# Patient Record
Sex: FEMALE | Race: WHITE | Employment: FULL TIME | URBAN - METROPOLITAN AREA
[De-identification: names, ages, dates, MRNs, and addresses within clinical notes are randomized per-mention and may not be internally consistent; named-entity substitution may affect disease eponyms.]

---

## 2021-02-28 ENCOUNTER — HOSPITAL ENCOUNTER (EMERGENCY)
Age: 66
Discharge: HOME | End: 2021-02-28
Attending: EMERGENCY MEDICINE
Payer: COMMERCIAL

## 2021-02-28 ENCOUNTER — APPOINTMENT (OUTPATIENT)
Dept: GENERAL RADIOLOGY | Age: 66
End: 2021-02-28
Payer: COMMERCIAL

## 2021-02-28 VITALS
WEIGHT: 127 LBS | HEART RATE: 121 BPM | RESPIRATION RATE: 22 BRPM | DIASTOLIC BLOOD PRESSURE: 76 MMHG | OXYGEN SATURATION: 96 % | TEMPERATURE: 98.4 F | SYSTOLIC BLOOD PRESSURE: 119 MMHG

## 2021-02-28 DIAGNOSIS — J44.1 COPD EXACERBATION (HCC): Primary | ICD-10-CM

## 2021-02-28 LAB
ABSOLUTE EOS #: 1.31 K/UL (ref 0–0.44)
ABSOLUTE IMMATURE GRANULOCYTE: 0.02 K/UL (ref 0–0.3)
ABSOLUTE LYMPH #: 2.9 K/UL (ref 1.1–3.7)
ABSOLUTE MONO #: 1.04 K/UL (ref 0.1–1.2)
ALBUMIN SERPL-MCNC: 3.7 G/DL (ref 3.5–5.2)
ALBUMIN/GLOBULIN RATIO: ABNORMAL (ref 1–2.5)
ALP BLD-CCNC: 84 U/L (ref 35–104)
ALT SERPL-CCNC: 8 U/L (ref 5–33)
ANION GAP SERPL CALCULATED.3IONS-SCNC: 8 MMOL/L (ref 9–17)
AST SERPL-CCNC: 24 U/L
BASOPHILS # BLD: 1 % (ref 0–2)
BASOPHILS ABSOLUTE: 0.06 K/UL (ref 0–0.2)
BILIRUB SERPL-MCNC: 0.21 MG/DL (ref 0.3–1.2)
BUN BLDV-MCNC: 8 MG/DL (ref 8–23)
BUN/CREAT BLD: 10 (ref 9–20)
CALCIUM SERPL-MCNC: 9.3 MG/DL (ref 8.6–10.4)
CHLORIDE BLD-SCNC: 106 MMOL/L (ref 98–107)
CO2: 26 MMOL/L (ref 20–31)
CREAT SERPL-MCNC: 0.77 MG/DL (ref 0.5–0.9)
DIFFERENTIAL TYPE: ABNORMAL
EOSINOPHILS RELATIVE PERCENT: 14 % (ref 1–4)
GFR AFRICAN AMERICAN: >60 ML/MIN
GFR NON-AFRICAN AMERICAN: >60 ML/MIN
GFR SERPL CREATININE-BSD FRML MDRD: ABNORMAL ML/MIN/{1.73_M2}
GFR SERPL CREATININE-BSD FRML MDRD: ABNORMAL ML/MIN/{1.73_M2}
GLUCOSE BLD-MCNC: 98 MG/DL (ref 70–99)
HCT VFR BLD CALC: 40.5 % (ref 36.3–47.1)
HEMOGLOBIN: 13.4 G/DL (ref 11.9–15.1)
IMMATURE GRANULOCYTES: 0 %
LYMPHOCYTES # BLD: 30 % (ref 24–43)
MCH RBC QN AUTO: 35.3 PG (ref 25.2–33.5)
MCHC RBC AUTO-ENTMCNC: 33.1 G/DL (ref 28.4–34.8)
MCV RBC AUTO: 106.6 FL (ref 82.6–102.9)
MONOCYTES # BLD: 11 % (ref 3–12)
NRBC AUTOMATED: 0 PER 100 WBC
PDW BLD-RTO: 13.2 % (ref 11.8–14.4)
PLATELET # BLD: 389 K/UL (ref 138–453)
PLATELET ESTIMATE: ABNORMAL
PMV BLD AUTO: 8.9 FL (ref 8.1–13.5)
POTASSIUM SERPL-SCNC: 4.2 MMOL/L (ref 3.7–5.3)
RBC # BLD: 3.8 M/UL (ref 3.95–5.11)
RBC # BLD: ABNORMAL 10*6/UL
SARS-COV-2, RAPID: NOT DETECTED
SEG NEUTROPHILS: 44 % (ref 36–65)
SEGMENTED NEUTROPHILS ABSOLUTE COUNT: 4.27 K/UL (ref 1.5–8.1)
SODIUM BLD-SCNC: 140 MMOL/L (ref 135–144)
SPECIMEN DESCRIPTION: NORMAL
TOTAL PROTEIN: 7.1 G/DL (ref 6.4–8.3)
TROPONIN INTERP: NORMAL
TROPONIN T: NORMAL NG/ML
TROPONIN, HIGH SENSITIVITY: <6 NG/L (ref 0–14)
WBC # BLD: 9.6 K/UL (ref 3.5–11.3)
WBC # BLD: ABNORMAL 10*3/UL

## 2021-02-28 PROCEDURE — 99284 EMERGENCY DEPT VISIT MOD MDM: CPT

## 2021-02-28 PROCEDURE — 84484 ASSAY OF TROPONIN QUANT: CPT

## 2021-02-28 PROCEDURE — 80053 COMPREHEN METABOLIC PANEL: CPT

## 2021-02-28 PROCEDURE — 94761 N-INVAS EAR/PLS OXIMETRY MLT: CPT

## 2021-02-28 PROCEDURE — 71045 X-RAY EXAM CHEST 1 VIEW: CPT

## 2021-02-28 PROCEDURE — 6360000002 HC RX W HCPCS: Performed by: EMERGENCY MEDICINE

## 2021-02-28 PROCEDURE — U0002 COVID-19 LAB TEST NON-CDC: HCPCS

## 2021-02-28 PROCEDURE — 94640 AIRWAY INHALATION TREATMENT: CPT

## 2021-02-28 PROCEDURE — 85025 COMPLETE CBC W/AUTO DIFF WBC: CPT

## 2021-02-28 PROCEDURE — 2580000003 HC RX 258: Performed by: EMERGENCY MEDICINE

## 2021-02-28 PROCEDURE — 2700000000 HC OXYGEN THERAPY PER DAY

## 2021-02-28 PROCEDURE — 96374 THER/PROPH/DIAG INJ IV PUSH: CPT

## 2021-02-28 PROCEDURE — 93005 ELECTROCARDIOGRAM TRACING: CPT | Performed by: EMERGENCY MEDICINE

## 2021-02-28 RX ORDER — ALPRAZOLAM 0.25 MG/1
0.25 TABLET ORAL 2 TIMES DAILY PRN
Qty: 10 TABLET | Refills: 0 | Status: SHIPPED | OUTPATIENT
Start: 2021-02-28 | End: 2021-02-28

## 2021-02-28 RX ORDER — PREDNISONE 50 MG/1
50 TABLET ORAL DAILY
Qty: 5 TABLET | Refills: 0 | Status: SHIPPED | OUTPATIENT
Start: 2021-02-28 | End: 2021-03-05

## 2021-02-28 RX ORDER — AZITHROMYCIN 500 MG/1
500 TABLET, FILM COATED ORAL DAILY
Qty: 5 TABLET | Refills: 0 | Status: SHIPPED | OUTPATIENT
Start: 2021-02-28 | End: 2021-03-05

## 2021-02-28 RX ORDER — IPRATROPIUM BROMIDE AND ALBUTEROL SULFATE 2.5; .5 MG/3ML; MG/3ML
1 SOLUTION RESPIRATORY (INHALATION) ONCE
Status: COMPLETED | OUTPATIENT
Start: 2021-02-28 | End: 2021-02-28

## 2021-02-28 RX ORDER — 0.9 % SODIUM CHLORIDE 0.9 %
1000 INTRAVENOUS SOLUTION INTRAVENOUS ONCE
Status: COMPLETED | OUTPATIENT
Start: 2021-02-28 | End: 2021-02-28

## 2021-02-28 RX ORDER — ONDANSETRON 4 MG/1
4 TABLET, FILM COATED ORAL 3 TIMES DAILY PRN
Qty: 10 TABLET | Refills: 0 | Status: SHIPPED | OUTPATIENT
Start: 2021-02-28 | End: 2021-02-28

## 2021-02-28 RX ORDER — ALBUTEROL SULFATE 0.63 MG/3ML
1 SOLUTION RESPIRATORY (INHALATION) EVERY 6 HOURS PRN
COMMUNITY
End: 2021-03-20 | Stop reason: SDUPTHER

## 2021-02-28 RX ORDER — LEVOTHYROXINE SODIUM 0.07 MG/1
75 TABLET ORAL DAILY
COMMUNITY

## 2021-02-28 RX ORDER — DEXAMETHASONE SODIUM PHOSPHATE 4 MG/ML
4 INJECTION, SOLUTION INTRA-ARTICULAR; INTRALESIONAL; INTRAMUSCULAR; INTRAVENOUS; SOFT TISSUE ONCE
Status: COMPLETED | OUTPATIENT
Start: 2021-02-28 | End: 2021-02-28

## 2021-02-28 RX ADMIN — SODIUM CHLORIDE 1000 ML: 9 INJECTION, SOLUTION INTRAVENOUS at 04:40

## 2021-02-28 RX ADMIN — DEXAMETHASONE SODIUM PHOSPHATE 4 MG: 4 INJECTION, SOLUTION INTRAMUSCULAR; INTRAVENOUS at 04:41

## 2021-02-28 RX ADMIN — IPRATROPIUM BROMIDE AND ALBUTEROL SULFATE 1 AMPULE: 2.5; .5 SOLUTION RESPIRATORY (INHALATION) at 05:28

## 2021-02-28 ASSESSMENT — PAIN DESCRIPTION - PAIN TYPE: TYPE: ACUTE PAIN

## 2021-02-28 ASSESSMENT — PAIN DESCRIPTION - LOCATION: LOCATION: RIB CAGE

## 2021-02-28 NOTE — ED NOTES
Pt to ED c/o SOB and cough x4 days. Pt reports that she just returned from [place] and the cough began then. Pt reports that she feels soreness in her ribs from coughing and is unable to lay down. Pt has a hx of COPD.  Pt is A&Ox4, slightly labored breathing     Viet Bella, RN  02/28/21 8837

## 2021-02-28 NOTE — ED PROVIDER NOTES
EMERGENCY DEPARTMENT ENCOUNTER    Pt Name: Marisol John  MRN: [id number]  [place] 1955  Date of evaluation: 2/28/21  CHIEF COMPLAINT       Chief Complaint   Patient presents with    Shortness of Breath     x 4 days     HISTORY OF PRESENT ILLNESS   Patient is a 71-year-old female with PMH of COPD who presents to the ED for evaluation of wheezing and shortness of breath. Symptoms started 4 days ago after returning from [place]. Before arrival to the ED she tried her \"inhaler and smoked a few cigarettes\" however it did not improve her symptoms. No fevers, chest pain, abdominal pain, nausea, vomiting, changes in urine or stool. REVIEW OF SYSTEMS     Review of Systems   All other systems reviewed and are negative. PASTMEDICAL HISTORY     Past Medical History:   Diagnosis Date    COPD (chronic obstructive pulmonary disease) ([facility] [address]     Thyroid disease      SURGICAL HISTORY     No past surgical history on file. CURRENT MEDICATIONS       Previous Medications    ALBUTEROL (ACCUNEB) 0.63 MG/3ML NEBULIZER SOLUTION    Take 1 ampule by nebulization every 6 hours as needed for Wheezing    LEVOTHYROXINE (SYNTHROID) 75 MCG TABLET    Take 75 mcg by mouth Daily     ALLERGIES     has No Known Allergies. FAMILY HISTORY     has no family status information on file. SOCIAL HISTORY       Social History     Tobacco Use    Smoking status: Current Every Day Smoker    Smokeless tobacco: Never Used   Substance Use Topics    Alcohol use: Yes    Drug use: Never     PHYSICAL EXAM     INITIAL VITALS: /76   Pulse 121   Temp 98.4 °F (36.9 °C)   Resp 22   Wt 127 lb (57.6 kg)   SpO2 96%    Physical Exam  HENT:      Head: Normocephalic. Right Ear: External ear normal.      Left Ear: External ear normal.      Nose: Nose normal.   Eyes:      Conjunctiva/sclera: Conjunctivae normal.   Cardiovascular:      Rate and Rhythm: Normal rate.    Pulmonary:      Effort: Pulmonary effort is normal.      Breath sounds: Wheezing present. Abdominal:      General: Abdomen is flat. Skin:     General: Skin is dry. Neurological:      Mental Status: She is alert. Mental status is at baseline. Psychiatric:         Mood and Affect: Mood normal.         Behavior: Behavior normal.      Comments:     Limited exam secondary to Covid-19 pandemic. MEDICAL DECISION MAKING:   The patient is hemodynamically stable, afebrile, nontoxic-appearing. Physical exam remarkable for mild wheezes bilaterally. Based on history and exam likely COPD exacerbation. ED plan for rapid Covid, basic labs, chest x-ray, breathing treatment, observation. DIAGNOSTIC RESULTS   EKG:All EKG's are interpreted by the Emergency Department Physician who either signs or Co-signs this chart in the absence of a cardiologist.        RADIOLOGY:All plain film, CT, MRI, and formal ultrasound images (except ED bedside ultrasound) are read by the radiologist, see reports below, unless otherwisenoted in MDM or here. XR CHEST PORTABLE   Final Result   No acute cardiopulmonary abnormality. LABS: All lab results were reviewed by myself, and all abnormals are listed below. Labs Reviewed   CBC WITH AUTO DIFFERENTIAL - Abnormal; Notable for the following components:       Result Value    RBC 3.80 (*)     .6 (*)     MCH 35.3 (*)     Eosinophils % 14 (*)     Absolute Eos # 1.31 (*)     All other components within normal limits   COMPREHENSIVE METABOLIC PANEL W/ REFLEX TO MG FOR LOW K - Abnormal; Notable for the following components:    Anion Gap 8 (*)     Total Bilirubin 0.21 (*)     All other components within normal limits   COVID-19, RAPID   TROPONIN       EMERGENCY DEPARTMENTCOURSE:   Patient did well in the ED. Labs remarkable for:  Negative COVID-19  Potassium 4.2  Creatinine 0.77  WBC 9.6  Symptoms improved with breathing treatment. Given Decadron 4 mg IV in the ED. Given Rx for azithromycin and prednisone.   No further work-up indicated this time.      Nursing notes reviewed. At this time this is what I find, the patient appears well and does not appear sick or toxic. I gave my usual and customary discussion of the risks and benefits of discharge versus admission. I answered the patient's questions. I gave the patient strict return precautions. Patient expressed understanding of the discharge instructions. Dictated but not reviewed. Vitals:    Vitals:    02/28/21 0419 02/28/21 0444 02/28/21 0526   BP: 119/76     Pulse: 121     Resp: 28 22   Temp: 98.4 °F (36.9 °C)     SpO2: 93%  96%   Weight:  127 lb (57.6 kg)        The patient was given the following medications while in the emergency department:  Orders Placed This Encounter   Medications    ipratropium-albuterol (DUONEB) nebulizer solution 1 ampule    0.9 % sodium chloride bolus    dexamethasone (DECADRON) injection 4 mg    DISCONTD: ondansetron (ZOFRAN) 4 MG tablet     Sig: Take 1 tablet by mouth 3 times daily as needed for Nausea or Vomiting     Dispense:  10 tablet     Refill:  0    DISCONTD: ALPRAZolam (XANAX) 0.25 MG tablet     Sig: Take 1 tablet by mouth 2 times daily as needed for Anxiety for up to 30 days. Dispense:  10 tablet     Refill:  0    predniSONE (DELTASONE) 50 MG tablet     Sig: Take 1 tablet by mouth daily for 5 days     Dispense:  5 tablet     Refill:  0    azithromycin (ZITHROMAX) 500 MG tablet     Sig: Take 1 tablet by mouth daily for 5 days     Dispense:  5 tablet     Refill:  0     CONSULTS:  None    FINAL IMPRESSION      1. COPD exacerbation (HCC)          DISPOSITION/PLAN   DISPOSITION Decision To Discharge 02/28/2021 05:57:33 AM      PATIENT REFERRED TO:  No follow-up provider specified.   DISCHARGE MEDICATIONS:  New Prescriptions    AZITHROMYCIN (ZITHROMAX) 500 MG TABLET    Take 1 tablet by mouth daily for 5 days    PREDNISONE (DELTASONE) 50 MG TABLET    Take 1 tablet by mouth daily for 5 days     Chapis Godinez MD  Attending Emergency Physician                    Valentin Garsia MD  02/28/21 220 [facility] Ugo Friend MD  02/28/21 2187

## 2021-03-01 LAB
EKG ATRIAL RATE: 116 BPM
EKG P AXIS: 78 DEGREES
EKG P-R INTERVAL: 148 MS
EKG Q-T INTERVAL: 328 MS
EKG QRS DURATION: 68 MS
EKG QTC CALCULATION (BAZETT): 455 MS
EKG R AXIS: 69 DEGREES
EKG T AXIS: 74 DEGREES
EKG VENTRICULAR RATE: 116 BPM

## 2021-03-20 ENCOUNTER — APPOINTMENT (OUTPATIENT)
Dept: GENERAL RADIOLOGY | Age: 66
End: 2021-03-20
Payer: COMMERCIAL

## 2021-03-20 ENCOUNTER — HOSPITAL ENCOUNTER (EMERGENCY)
Age: 66
Discharge: HOME | End: 2021-03-20
Attending: EMERGENCY MEDICINE
Payer: COMMERCIAL

## 2021-03-20 VITALS
HEIGHT: 63 IN | HEART RATE: 89 BPM | WEIGHT: 127 LBS | SYSTOLIC BLOOD PRESSURE: 115 MMHG | TEMPERATURE: 98.5 F | DIASTOLIC BLOOD PRESSURE: 68 MMHG | OXYGEN SATURATION: 96 % | BODY MASS INDEX: 22.5 KG/M2 | RESPIRATION RATE: 15 BRPM

## 2021-03-20 DIAGNOSIS — J44.1 COPD EXACERBATION (HCC): Primary | ICD-10-CM

## 2021-03-20 LAB
ABSOLUTE EOS #: 1.46 K/UL (ref 0–0.44)
ABSOLUTE IMMATURE GRANULOCYTE: 0.02 K/UL (ref 0–0.3)
ABSOLUTE LYMPH #: 2.89 K/UL (ref 1.1–3.7)
ABSOLUTE MONO #: 0.71 K/UL (ref 0.1–1.2)
ANION GAP SERPL CALCULATED.3IONS-SCNC: 11 MMOL/L (ref 9–17)
BASOPHILS # BLD: 1 % (ref 0–2)
BASOPHILS ABSOLUTE: 0.07 K/UL (ref 0–0.2)
BNP INTERPRETATION: NORMAL
BUN BLDV-MCNC: 10 MG/DL (ref 8–23)
BUN/CREAT BLD: 12 (ref 9–20)
CALCIUM SERPL-MCNC: 9.2 MG/DL (ref 8.6–10.4)
CHLORIDE BLD-SCNC: 103 MMOL/L (ref 98–107)
CO2: 22 MMOL/L (ref 20–31)
CREAT SERPL-MCNC: 0.84 MG/DL (ref 0.5–0.9)
DIFFERENTIAL TYPE: ABNORMAL
EKG ATRIAL RATE: 107 BPM
EKG P AXIS: 77 DEGREES
EKG P-R INTERVAL: 144 MS
EKG Q-T INTERVAL: 328 MS
EKG QRS DURATION: 62 MS
EKG QTC CALCULATION (BAZETT): 437 MS
EKG R AXIS: 77 DEGREES
EKG T AXIS: 81 DEGREES
EKG VENTRICULAR RATE: 107 BPM
EOSINOPHILS RELATIVE PERCENT: 19 % (ref 1–4)
GFR AFRICAN AMERICAN: >60 ML/MIN
GFR NON-AFRICAN AMERICAN: >60 ML/MIN
GFR SERPL CREATININE-BSD FRML MDRD: NORMAL ML/MIN/{1.73_M2}
GFR SERPL CREATININE-BSD FRML MDRD: NORMAL ML/MIN/{1.73_M2}
GLUCOSE BLD-MCNC: 87 MG/DL (ref 70–99)
HCT VFR BLD CALC: 41.3 % (ref 36.3–47.1)
HEMOGLOBIN: 13.6 G/DL (ref 11.9–15.1)
IMMATURE GRANULOCYTES: 0 %
LYMPHOCYTES # BLD: 37 % (ref 24–43)
MCH RBC QN AUTO: 34.8 PG (ref 25.2–33.5)
MCHC RBC AUTO-ENTMCNC: 32.9 G/DL (ref 28.4–34.8)
MCV RBC AUTO: 105.6 FL (ref 82.6–102.9)
MONOCYTES # BLD: 9 % (ref 3–12)
MYOGLOBIN: 41 NG/ML (ref 25–58)
NRBC AUTOMATED: 0 PER 100 WBC
PDW BLD-RTO: 12.3 % (ref 11.8–14.4)
PLATELET # BLD: 391 K/UL (ref 138–453)
PLATELET ESTIMATE: ABNORMAL
PMV BLD AUTO: 9.2 FL (ref 8.1–13.5)
POTASSIUM SERPL-SCNC: 3.7 MMOL/L (ref 3.7–5.3)
PRO-BNP: 103 PG/ML
RBC # BLD: 3.91 M/UL (ref 3.95–5.11)
RBC # BLD: ABNORMAL 10*6/UL
SARS-COV-2, RAPID: NOT DETECTED
SEG NEUTROPHILS: 34 % (ref 36–65)
SEGMENTED NEUTROPHILS ABSOLUTE COUNT: 2.64 K/UL (ref 1.5–8.1)
SODIUM BLD-SCNC: 136 MMOL/L (ref 135–144)
SPECIMEN DESCRIPTION: NORMAL
TROPONIN INTERP: NORMAL
TROPONIN T: NORMAL NG/ML
TROPONIN, HIGH SENSITIVITY: 9 NG/L (ref 0–14)
WBC # BLD: 7.8 K/UL (ref 3.5–11.3)
WBC # BLD: ABNORMAL 10*3/UL

## 2021-03-20 PROCEDURE — 2700000000 HC OXYGEN THERAPY PER DAY

## 2021-03-20 PROCEDURE — 96374 THER/PROPH/DIAG INJ IV PUSH: CPT

## 2021-03-20 PROCEDURE — 83880 ASSAY OF NATRIURETIC PEPTIDE: CPT

## 2021-03-20 PROCEDURE — 80048 BASIC METABOLIC PNL TOTAL CA: CPT

## 2021-03-20 PROCEDURE — 6360000002 HC RX W HCPCS: Performed by: EMERGENCY MEDICINE

## 2021-03-20 PROCEDURE — 6370000000 HC RX 637 (ALT 250 FOR IP): Performed by: EMERGENCY MEDICINE

## 2021-03-20 PROCEDURE — 94640 AIRWAY INHALATION TREATMENT: CPT

## 2021-03-20 PROCEDURE — 83874 ASSAY OF MYOGLOBIN: CPT

## 2021-03-20 PROCEDURE — 84484 ASSAY OF TROPONIN QUANT: CPT

## 2021-03-20 PROCEDURE — 99284 EMERGENCY DEPT VISIT MOD MDM: CPT

## 2021-03-20 PROCEDURE — 85025 COMPLETE CBC W/AUTO DIFF WBC: CPT

## 2021-03-20 PROCEDURE — 94761 N-INVAS EAR/PLS OXIMETRY MLT: CPT

## 2021-03-20 PROCEDURE — U0002 COVID-19 LAB TEST NON-CDC: HCPCS

## 2021-03-20 PROCEDURE — 71045 X-RAY EXAM CHEST 1 VIEW: CPT

## 2021-03-20 PROCEDURE — 93005 ELECTROCARDIOGRAM TRACING: CPT | Performed by: EMERGENCY MEDICINE

## 2021-03-20 RX ORDER — METHYLPREDNISOLONE SODIUM SUCCINATE 125 MG/2ML
125 INJECTION, POWDER, LYOPHILIZED, FOR SOLUTION INTRAMUSCULAR; INTRAVENOUS ONCE
Status: COMPLETED | OUTPATIENT
Start: 2021-03-20 | End: 2021-03-20

## 2021-03-20 RX ORDER — IPRATROPIUM BROMIDE AND ALBUTEROL SULFATE 2.5; .5 MG/3ML; MG/3ML
1 SOLUTION RESPIRATORY (INHALATION) ONCE
Status: COMPLETED | OUTPATIENT
Start: 2021-03-20 | End: 2021-03-20

## 2021-03-20 RX ORDER — PREDNISONE 20 MG/1
40 TABLET ORAL DAILY
Qty: 16 TABLET | Refills: 0 | Status: SHIPPED | OUTPATIENT
Start: 2021-03-21 | End: 2021-03-29

## 2021-03-20 RX ORDER — AZITHROMYCIN 500 MG/1
500 TABLET, FILM COATED ORAL DAILY
Qty: 3 TABLET | Refills: 0 | Status: SHIPPED | OUTPATIENT
Start: 2021-03-20 | End: 2021-03-23

## 2021-03-20 RX ORDER — ALBUTEROL SULFATE 0.63 MG/3ML
1 SOLUTION RESPIRATORY (INHALATION) EVERY 6 HOURS PRN
Qty: 270 ML | Refills: 0 | Status: SHIPPED | OUTPATIENT
Start: 2021-03-20

## 2021-03-20 RX ADMIN — IPRATROPIUM BROMIDE AND ALBUTEROL SULFATE 1 AMPULE: .5; 3 SOLUTION RESPIRATORY (INHALATION) at 03:07

## 2021-03-20 RX ADMIN — METHYLPREDNISOLONE SODIUM SUCCINATE 125 MG: 125 INJECTION, POWDER, FOR SOLUTION INTRAMUSCULAR; INTRAVENOUS at 02:03

## 2021-03-20 ASSESSMENT — ENCOUNTER SYMPTOMS
SINUS PAIN: 0
ABDOMINAL DISTENTION: 0
VOMITING: 0
EYES NEGATIVE: 1
WHEEZING: 1
APNEA: 0
CONSTIPATION: 0
SHORTNESS OF BREATH: 1
DIARRHEA: 0
CHEST TIGHTNESS: 0
COLOR CHANGE: 0
COUGH: 1

## 2021-03-20 NOTE — ED NOTES
Bed: 20  Expected date: 3/19/21  Expected time: 2:52 PM  Means of arrival:   Comments:  Held- franklin Gunter, APRN - CNP  03/20/21 5722

## 2021-03-20 NOTE — ED NOTES
Pt is 88% on room air. Pt placed on 3L and feeling better.  Oxygen saturation now 97%     Emily Portillo RN  03/20/21 8341

## 2021-03-20 NOTE — ED PROVIDER NOTES
problem list on file for this patient. SURGICAL HISTORY     History reviewed. No pertinent surgical history. CURRENT MEDICATIONS       Discharge Medication List as of 3/20/2021  4:01 AM      CONTINUE these medications which have NOT CHANGED    Details   levothyroxine (SYNTHROID) 75 MCG tablet Take 75 mcg by mouth DailyHistorical Med           ALLERGIES     has No Known Allergies. FAMILY HISTORY     has no family status information on file. SOCIAL HISTORY       Social History     Tobacco Use    Smoking status: Current Every Day Smoker    Smokeless tobacco: Never Used   Substance Use Topics    Alcohol use: Yes    Drug use: Never     PHYSICAL EXAM     INITIAL VITALS: /68   Pulse 89   Temp 98.5 °F (36.9 °C) (Oral)   Resp 15   Ht 5' 3\" (1.6 m)   Wt 127 lb (57.6 kg)   SpO2 96%   BMI 22.50 kg/m²    Physical Exam  Constitutional:       General: She is not in acute distress. Appearance: She is well-developed. HENT:      Head: Normocephalic. Eyes:      Pupils: Pupils are equal, round, and reactive to light. Cardiovascular:      Rate and Rhythm: Regular rhythm. Tachycardia present. Heart sounds: Normal heart sounds. Pulmonary:      Effort: Pulmonary effort is normal. No respiratory distress. Breath sounds: Wheezing present. Abdominal:      General: Bowel sounds are normal.      Palpations: Abdomen is soft. Tenderness: There is no abdominal tenderness. Musculoskeletal: Normal range of motion. Skin:     General: Skin is warm and dry. Neurological:      Mental Status: She is alert and oriented to person, place, and time. MEDICAL DECISION MAKING:     Patient's clinical history and physical exam are suggestive of COPD exacerbation. Patient initially 88% on room air and was placed on 2 L nasal cannula. She does not have any significant EKG changes.   She does have wheezing throughout all lung fields on exam.  History is not suggestive for COVID-19 patient has a normal temperature and negative Covid test here in the ED. Given severity of symptoms and hypoxia on room air recommendation from my standpoint was admission. Patient states due to insurance issues and cost she is unable to stay. Patient was started on steroids and given albuterol treatment here in the ED. She is reporting symptomatic improvement with symptoms. Patient discharged per her request but instructed to return for any worsening of symptoms. CRITICAL CARE:       PROCEDURES:    Procedures    DIAGNOSTIC RESULTS   EKG:All EKG's are interpreted by the Emergency Department Physician who either signs or Co-signs this chart in the absence of a cardiologist.    EKG-poor quality interpretation adversely affected  Rate 107 sinus tachycardia  No ST or T wave changes    QTc 437  Other than elevated heart rate no significant changes    RADIOLOGY:All plain film, CT, MRI, and formal ultrasound images (except ED bedside ultrasound) are read by the radiologist, see reports below, unless otherwisenoted in MDM or here. XR CHEST PORTABLE   Final Result   Unremarkable single upright portable AP view of the chest.           LABS: All lab results were reviewed by myself, and all abnormals are listed below.   Labs Reviewed   CBC WITH AUTO DIFFERENTIAL - Abnormal; Notable for the following components:       Result Value    RBC 3.91 (*)     .6 (*)     MCH 34.8 (*)     Seg Neutrophils 34 (*)     Eosinophils % 19 (*)     Absolute Eos # 1.46 (*)     All other components within normal limits   COVID-19, RAPID   BRAIN NATRIURETIC PEPTIDE   BASIC METABOLIC PANEL W/ REFLEX TO MG FOR LOW K   TROP/MYOGLOBIN       EMERGENCY DEPARTMENTCOURSE:         Vitals:    Vitals:    03/20/21 0145 03/20/21 0237 03/20/21 0307 03/20/21 0337   BP:       Pulse: 105 88  89   Resp: 20 18 18 15   Temp:       TempSrc:       SpO2: 95% 97% 98% 96%   Weight:       Height:           The patient was given the following medications while in the emergency department:  Orders Placed This Encounter   Medications    methylPREDNISolone sodium (SOLU-MEDROL) injection 125 mg    ipratropium-albuterol (DUONEB) nebulizer solution 1 ampule    albuterol (ACCUNEB) 0.63 MG/3ML nebulizer solution     Sig: Take 3 mLs by nebulization every 6 hours as needed for Wheezing     Dispense:  270 mL     Refill:  0    azithromycin (ZITHROMAX) 500 MG tablet     Sig: Take 1 tablet by mouth daily for 3 days     Dispense:  3 tablet     Refill:  0    predniSONE (DELTASONE) 20 MG tablet     Sig: Take 2 tablets by mouth daily for 8 days Take 2 tablets daily days 1 through 5. Take 1 tablet daily days 6 through 8. Take half tablet daily days 9 through 11. Dispense:  16 tablet     Refill:  0     CONSULTS:  None    FINAL IMPRESSION      1. COPD exacerbation ([facility] [address]          DISPOSITION/PLAN   DISPOSITION Decision To Discharge 03/20/2021 03:56:49 AM      PATIENT REFERRED TO:  No follow-up provider specified. DISCHARGE MEDICATIONS:  Discharge Medication List as of 3/20/2021  4:01 AM      START taking these medications    Details   azithromycin (ZITHROMAX) 500 MG tablet Take 1 tablet by mouth daily for 3 days, Disp-3 tablet, R-0Print      predniSONE (DELTASONE) 20 MG tablet Take 2 tablets by mouth daily for 8 days Take 2 tablets daily days 1 through 5. Take 1 tablet daily days 6 through 8.   Take half tablet daily days 9 through 11., Disp-16 tablet, R-0Print           Indra Hawkins MD  Attending Emergency Physician                 Justin Sheppard MD  03/20/21 8887

## 2021-03-20 NOTE — ED NOTES
Pt taken off oxygen. Drops back down to 88%. Recommended by dr to stay in hospital pt states she needs to go home and cannot stay.       Tamara Holloway RN  03/20/21 0978

## 2025-04-30 ENCOUNTER — HOSPITAL ENCOUNTER (EMERGENCY)
Age: 70
Discharge: HOME | End: 2025-04-30
Payer: COMMERCIAL

## 2025-04-30 ENCOUNTER — APPOINTMENT (OUTPATIENT)
Dept: CT IMAGING | Age: 70
End: 2025-04-30
Payer: COMMERCIAL

## 2025-04-30 VITALS
RESPIRATION RATE: 18 BRPM | TEMPERATURE: 98.1 F | SYSTOLIC BLOOD PRESSURE: 124 MMHG | BODY MASS INDEX: 22.86 KG/M2 | HEART RATE: 82 BPM | HEIGHT: 63 IN | WEIGHT: 129 LBS | DIASTOLIC BLOOD PRESSURE: 81 MMHG | OXYGEN SATURATION: 98 %

## 2025-04-30 DIAGNOSIS — R42 DIZZINESS: Primary | ICD-10-CM

## 2025-04-30 LAB
ANION GAP SERPL CALCULATED.3IONS-SCNC: 13 MMOL/L (ref 9–16)
BASOPHILS # BLD: <0.03 K/UL (ref 0–0.2)
BASOPHILS NFR BLD: 0 % (ref 0–2)
BUN SERPL-MCNC: 14 MG/DL (ref 8–23)
CALCIUM SERPL-MCNC: 8.6 MG/DL (ref 8.8–10.2)
CHLORIDE SERPL-SCNC: 109 MMOL/L (ref 98–107)
CO2 SERPL-SCNC: 22 MMOL/L (ref 20–31)
CREAT SERPL-MCNC: 0.9 MG/DL (ref 0.5–0.9)
EOSINOPHIL # BLD: 0.14 K/UL (ref 0–0.44)
EOSINOPHILS RELATIVE PERCENT: 2 % (ref 1–4)
ERYTHROCYTE [DISTWIDTH] IN BLOOD BY AUTOMATED COUNT: 13 % (ref 11.8–14.4)
GFR, ESTIMATED: 66 ML/MIN/1.73M2
GLUCOSE SERPL-MCNC: 97 MG/DL (ref 82–115)
HCT VFR BLD AUTO: 34.7 % (ref 36.3–47.1)
HGB BLD-MCNC: 11.9 G/DL (ref 11.9–15.1)
IMM GRANULOCYTES # BLD AUTO: 0.03 K/UL (ref 0–0.3)
IMM GRANULOCYTES NFR BLD: 0 %
LYMPHOCYTES NFR BLD: 1.56 K/UL (ref 1.1–3.7)
LYMPHOCYTES RELATIVE PERCENT: 20 % (ref 24–43)
MAGNESIUM SERPL-MCNC: 2.2 MG/DL (ref 1.6–2.4)
MCH RBC QN AUTO: 36.2 PG (ref 25.2–33.5)
MCHC RBC AUTO-ENTMCNC: 34.3 G/DL (ref 28.4–34.8)
MCV RBC AUTO: 105.5 FL (ref 82.6–102.9)
MONOCYTES NFR BLD: 0.87 K/UL (ref 0.1–1.2)
MONOCYTES NFR BLD: 11 % (ref 3–12)
MYOGLOBIN SERPL-MCNC: 43 NG/ML (ref 25–58)
MYOGLOBIN SERPL-MCNC: 44 NG/ML (ref 25–58)
NEUTROPHILS NFR BLD: 67 % (ref 36–65)
NEUTS SEG NFR BLD: 5.1 K/UL (ref 1.5–8.1)
NRBC BLD-RTO: 0 PER 100 WBC
PLATELET # BLD AUTO: 278 K/UL (ref 138–453)
PMV BLD AUTO: 9.1 FL (ref 8.1–13.5)
POTASSIUM SERPL-SCNC: 4.2 MMOL/L (ref 3.7–5.3)
RBC # BLD AUTO: 3.29 M/UL (ref 3.95–5.11)
RBC # BLD: ABNORMAL 10*6/UL
SODIUM SERPL-SCNC: 145 MMOL/L (ref 136–145)
TROPONIN I SERPL HS-MCNC: <6 NG/L (ref 0–14)
TROPONIN I SERPL HS-MCNC: <6 NG/L (ref 0–14)
WBC OTHER # BLD: 7.7 K/UL (ref 3.5–11.3)

## 2025-04-30 PROCEDURE — 84484 ASSAY OF TROPONIN QUANT: CPT

## 2025-04-30 PROCEDURE — 93005 ELECTROCARDIOGRAM TRACING: CPT | Performed by: PHYSICIAN ASSISTANT

## 2025-04-30 PROCEDURE — 70450 CT HEAD/BRAIN W/O DYE: CPT

## 2025-04-30 PROCEDURE — 85025 COMPLETE CBC W/AUTO DIFF WBC: CPT

## 2025-04-30 PROCEDURE — 96374 THER/PROPH/DIAG INJ IV PUSH: CPT

## 2025-04-30 PROCEDURE — 6360000002 HC RX W HCPCS: Performed by: PHYSICIAN ASSISTANT

## 2025-04-30 PROCEDURE — 80048 BASIC METABOLIC PNL TOTAL CA: CPT

## 2025-04-30 PROCEDURE — 83735 ASSAY OF MAGNESIUM: CPT

## 2025-04-30 PROCEDURE — 2580000003 HC RX 258: Performed by: PHYSICIAN ASSISTANT

## 2025-04-30 PROCEDURE — 6370000000 HC RX 637 (ALT 250 FOR IP): Performed by: PHYSICIAN ASSISTANT

## 2025-04-30 PROCEDURE — 83874 ASSAY OF MYOGLOBIN: CPT

## 2025-04-30 PROCEDURE — 99284 EMERGENCY DEPT VISIT MOD MDM: CPT

## 2025-04-30 RX ORDER — ONDANSETRON 2 MG/ML
4 INJECTION INTRAMUSCULAR; INTRAVENOUS ONCE
Status: COMPLETED | OUTPATIENT
Start: 2025-04-30 | End: 2025-04-30

## 2025-04-30 RX ORDER — ONDANSETRON 4 MG/1
4 TABLET, ORALLY DISINTEGRATING ORAL EVERY 8 HOURS PRN
Qty: 21 TABLET | Refills: 0 | Status: SHIPPED | OUTPATIENT
Start: 2025-04-30 | End: 2025-05-07

## 2025-04-30 RX ORDER — 0.9 % SODIUM CHLORIDE 0.9 %
1000 INTRAVENOUS SOLUTION INTRAVENOUS ONCE
Status: COMPLETED | OUTPATIENT
Start: 2025-04-30 | End: 2025-04-30

## 2025-04-30 RX ORDER — MECLIZINE HCL 12.5 MG 12.5 MG/1
25 TABLET ORAL ONCE
Status: COMPLETED | OUTPATIENT
Start: 2025-04-30 | End: 2025-04-30

## 2025-04-30 RX ORDER — MECLIZINE HYDROCHLORIDE 25 MG/1
25 TABLET ORAL 3 TIMES DAILY PRN
Qty: 30 TABLET | Refills: 0 | Status: SHIPPED | OUTPATIENT
Start: 2025-04-30 | End: 2025-05-10

## 2025-04-30 RX ADMIN — MECLIZINE 25 MG: 12.5 TABLET ORAL at 09:40

## 2025-04-30 RX ADMIN — ONDANSETRON 4 MG: 2 INJECTION, SOLUTION INTRAMUSCULAR; INTRAVENOUS at 09:38

## 2025-04-30 RX ADMIN — SODIUM CHLORIDE 1000 ML: 0.9 INJECTION, SOLUTION INTRAVENOUS at 09:38

## 2025-04-30 ASSESSMENT — PAIN - FUNCTIONAL ASSESSMENT: PAIN_FUNCTIONAL_ASSESSMENT: NONE - DENIES PAIN

## 2025-04-30 NOTE — ED PROVIDER NOTES
[facility] EMERGENCY DEPARTMENT  [facility]      Pt Name: Bella Barillas  MRN: [id number]  Birthdate 1955  Date ofevaluation: 4/30/2025  Provider: Clemente Ricardo PA-C    CHIEF COMPLAINT       Chief Complaint   Patient presents with    Dizziness         HISTORY OF PRESENT ILLNESS  (Location/Symptom, Timing/Onset, Context/Setting, Quality, Duration, Modifying Factors, Severity.)   Bella Barillas is a 69 y.o. female who presents to the emergency department with dizziness.  Incident occurred roughly 2 weeks ago.  Reports sense of movement and spinning.  Have resolved.  Returned today this morning when she got out of bed.  When she moves around symptoms returned described as spinning and feeling drunk.  No chest pain shortness of breath headache chest pain shortness of breath.      Nursing Notes were reviewed.    ALLERGIES     Patient has no known allergies.    CURRENT MEDICATIONS       Previous Medications    ALBUTEROL (ACCUNEB) 0.63 MG/3ML NEBULIZER SOLUTION    Take 3 mLs by nebulization every 6 hours as needed for Wheezing    LEVOTHYROXINE (SYNTHROID) 75 MCG TABLET    Take 75 mcg by mouth Daily       PAST MEDICAL HISTORY         Diagnosis Date    COPD (chronic obstructive pulmonary disease) (HCC)     Thyroid disease        SURGICAL HISTORY     No past surgical history on file.      HISTORY     No family history on file.  No family status information on file.        SOCIAL HISTORY      reports that she has been smoking cigarettes. She has never used smokeless tobacco. She reports current alcohol use. She reports that she does not use drugs.    REVIEW OFSYSTEMS    (2-9 systems for level 4, 10 or more for level 5)   Review of Systems    Except as noted above the remainder of the review of systems was reviewed and negative.     PHYSICAL EXAM    (up to 7 for level 4, 8 or more for level 5)     ED Triage Vitals [04/30/25 0905]   BP Systolic BP Percentile Diastolic BP Percentile Temp Temp src

## 2025-04-30 NOTE — ED NOTES
Pt to the ED due to dizziness. Pt arrives via private auto to the ED with c/o dizziness that started last night. Pt states she tried to sleep it off, but was worse after she woke this morning. Pt states she feels \"foggy\" and at times the room is spinning. Pt denies any prior Hx of this occurring. Pt is Aox4, breathing equal and non-labored. Pt is independent, gait steady upon arrival to ED room 27. Pt denies any further needs at this time.

## 2025-04-30 NOTE — ED NOTES
Pt ambulated to the restroom. Pt walked an estimated 150 feet. Pt tolerated well, but did state, \"I still feel so foggy.\" Findings reported to Clemente WEBBER.

## 2025-04-30 NOTE — ED TRIAGE NOTES
Pt reports dizziness starting upon waking up, with slight palpitations. Also reports feeling unbalanced and vision issues. Reports recent intermittent episodes.

## 2025-05-01 LAB
EKG ATRIAL RATE: 74 BPM
EKG P AXIS: 63 DEGREES
EKG P-R INTERVAL: 134 MS
EKG Q-T INTERVAL: 382 MS
EKG QRS DURATION: 68 MS
EKG QTC CALCULATION (BAZETT): 424 MS
EKG R AXIS: 63 DEGREES
EKG T AXIS: 67 DEGREES
EKG VENTRICULAR RATE: 74 BPM

## 2025-07-12 ENCOUNTER — HOSPITAL ENCOUNTER (INPATIENT)
Age: 70
LOS: 4 days | Discharge: HOME | DRG: 494 | End: 2025-07-16
Attending: EMERGENCY MEDICINE | Admitting: SURGERY
Payer: COMMERCIAL

## 2025-07-12 ENCOUNTER — APPOINTMENT (OUTPATIENT)
Dept: GENERAL RADIOLOGY | Age: 70
DRG: 494 | End: 2025-07-12
Payer: COMMERCIAL

## 2025-07-12 ENCOUNTER — APPOINTMENT (OUTPATIENT)
Dept: CT IMAGING | Age: 70
DRG: 494 | End: 2025-07-12
Payer: COMMERCIAL
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
## 2025-07-12 DIAGNOSIS — S82.862A MAISONNEUVE FRACTURE OF FIBULA, LEFT, CLOSED, INITIAL ENCOUNTER: Primary | ICD-10-CM

## 2025-07-12 PROBLEM — S82.432P: Status: ACTIVE | Noted: 2025-07-12

## 2025-07-12 LAB
25(OH)D3 SERPL-MCNC: 28.9 NG/ML (ref 30–100)
ALBUMIN SERPL-MCNC: 4.1 G/DL (ref 3.5–5.2)
ALBUMIN/GLOB SERPL: 1.5 {RATIO} (ref 1–2.5)
ALP SERPL-CCNC: 82 U/L (ref 35–104)
ALT SERPL-CCNC: 10 U/L (ref 10–35)
ANION GAP SERPL CALCULATED.3IONS-SCNC: 19 MMOL/L (ref 9–16)
AST SERPL-CCNC: 28 U/L (ref 10–35)
BASOPHILS # BLD: 0.04 K/UL (ref 0–0.2)
BASOPHILS NFR BLD: 1 % (ref 0–2)
BILIRUB DIRECT SERPL-MCNC: <0.1 MG/DL (ref 0–0.2)
BILIRUB INDIRECT SERPL-MCNC: NORMAL MG/DL (ref 0–1)
BILIRUB SERPL-MCNC: 0.2 MG/DL (ref 0–1.2)
BUN SERPL-MCNC: 9 MG/DL (ref 8–23)
CALCIUM SERPL-MCNC: 8.8 MG/DL (ref 8.6–10.4)
CHLORIDE SERPL-SCNC: 99 MMOL/L (ref 98–107)
CK SERPL-CCNC: 118 U/L (ref 26–192)
CO2 SERPL-SCNC: 16 MMOL/L (ref 20–31)
CREAT SERPL-MCNC: 1 MG/DL (ref 0.6–0.9)
EOSINOPHIL # BLD: 0.18 K/UL (ref 0–0.44)
EOSINOPHILS RELATIVE PERCENT: 2 % (ref 1–4)
ERYTHROCYTE [DISTWIDTH] IN BLOOD BY AUTOMATED COUNT: 13.9 % (ref 11.8–14.4)
EST. AVERAGE GLUCOSE BLD GHB EST-MCNC: 97 MG/DL
GFR, ESTIMATED: 61 ML/MIN/1.73M2
GLOBULIN SER CALC-MCNC: 2.8 G/DL
GLUCOSE SERPL-MCNC: 85 MG/DL (ref 74–99)
HBA1C MFR BLD: 5 % (ref 4–6)
HCT VFR BLD AUTO: 38.7 % (ref 36.3–47.1)
HGB BLD-MCNC: 12.5 G/DL (ref 11.9–15.1)
IMM GRANULOCYTES # BLD AUTO: <0.03 K/UL (ref 0–0.3)
IMM GRANULOCYTES NFR BLD: 0 %
INR PPP: 0.9
LYMPHOCYTES NFR BLD: 3.73 K/UL (ref 1.1–3.7)
LYMPHOCYTES RELATIVE PERCENT: 49 % (ref 24–43)
MCH RBC QN AUTO: 34.9 PG (ref 25.2–33.5)
MCHC RBC AUTO-ENTMCNC: 32.3 G/DL (ref 28.4–34.8)
MCV RBC AUTO: 108.1 FL (ref 82.6–102.9)
MONOCYTES NFR BLD: 0.76 K/UL (ref 0.1–1.2)
MONOCYTES NFR BLD: 10 % (ref 3–12)
MYOGLOBIN SERPL-MCNC: 79 NG/ML (ref 25–58)
NEUTROPHILS NFR BLD: 38 % (ref 36–65)
NEUTS SEG NFR BLD: 2.84 K/UL (ref 1.5–8.1)
NRBC BLD-RTO: 0 PER 100 WBC
PARTIAL THROMBOPLASTIN TIME: 27.7 SEC (ref 23–36.5)
PLATELET # BLD AUTO: 341 K/UL (ref 138–453)
PMV BLD AUTO: 9.7 FL (ref 8.1–13.5)
POTASSIUM SERPL-SCNC: 4 MMOL/L (ref 3.7–5.3)
PROT SERPL-MCNC: 6.9 G/DL (ref 6.6–8.7)
PROTHROMBIN TIME: 12.6 SEC (ref 11.7–14.9)
RBC # BLD AUTO: 3.58 M/UL (ref 3.95–5.11)
RBC # BLD: ABNORMAL 10*6/UL
SODIUM SERPL-SCNC: 134 MMOL/L (ref 136–145)
T4 FREE SERPL-MCNC: 1.2 NG/DL (ref 0.9–1.7)
TSH SERPL DL<=0.05 MIU/L-ACNC: 21.8 UIU/ML (ref 0.27–4.2)
WBC OTHER # BLD: 7.6 K/UL (ref 3.5–11.3)

## 2025-07-12 PROCEDURE — 82550 ASSAY OF CK (CPK): CPT

## 2025-07-12 PROCEDURE — 70450 CT HEAD/BRAIN W/O DYE: CPT

## 2025-07-12 PROCEDURE — 85730 THROMBOPLASTIN TIME PARTIAL: CPT

## 2025-07-12 PROCEDURE — 80076 HEPATIC FUNCTION PANEL: CPT

## 2025-07-12 PROCEDURE — 29505 APPLICATION LONG LEG SPLINT: CPT

## 2025-07-12 PROCEDURE — 73610 X-RAY EXAM OF ANKLE: CPT

## 2025-07-12 PROCEDURE — 84439 ASSAY OF FREE THYROXINE: CPT

## 2025-07-12 PROCEDURE — 99222 1ST HOSP IP/OBS MODERATE 55: CPT | Performed by: SURGERY

## 2025-07-12 PROCEDURE — 84443 ASSAY THYROID STIM HORMONE: CPT

## 2025-07-12 PROCEDURE — 80048 BASIC METABOLIC PNL TOTAL CA: CPT

## 2025-07-12 PROCEDURE — 73590 X-RAY EXAM OF LOWER LEG: CPT

## 2025-07-12 PROCEDURE — 72125 CT NECK SPINE W/O DYE: CPT

## 2025-07-12 PROCEDURE — 83036 HEMOGLOBIN GLYCOSYLATED A1C: CPT

## 2025-07-12 PROCEDURE — 73562 X-RAY EXAM OF KNEE 3: CPT

## 2025-07-12 PROCEDURE — 1200000000 HC SEMI PRIVATE

## 2025-07-12 PROCEDURE — 73700 CT LOWER EXTREMITY W/O DYE: CPT

## 2025-07-12 PROCEDURE — 99285 EMERGENCY DEPT VISIT HI MDM: CPT

## 2025-07-12 PROCEDURE — 85610 PROTHROMBIN TIME: CPT

## 2025-07-12 PROCEDURE — 96376 TX/PRO/DX INJ SAME DRUG ADON: CPT

## 2025-07-12 PROCEDURE — 6360000002 HC RX W HCPCS

## 2025-07-12 PROCEDURE — 2580000003 HC RX 258

## 2025-07-12 PROCEDURE — 82306 VITAMIN D 25 HYDROXY: CPT

## 2025-07-12 PROCEDURE — 83874 ASSAY OF MYOGLOBIN: CPT

## 2025-07-12 PROCEDURE — 85025 COMPLETE CBC W/AUTO DIFF WBC: CPT

## 2025-07-12 PROCEDURE — 96374 THER/PROPH/DIAG INJ IV PUSH: CPT

## 2025-07-12 RX ORDER — SODIUM CHLORIDE 0.9 % (FLUSH) 0.9 %
5-40 SYRINGE (ML) INJECTION PRN
Status: DISCONTINUED | OUTPATIENT
Start: 2025-07-12 | End: 2025-07-16 | Stop reason: HOSPADM

## 2025-07-12 RX ORDER — ENOXAPARIN SODIUM 100 MG/ML
30 INJECTION SUBCUTANEOUS 2 TIMES DAILY
Status: DISCONTINUED | OUTPATIENT
Start: 2025-07-12 | End: 2025-07-16 | Stop reason: HOSPADM

## 2025-07-12 RX ORDER — SENNOSIDES 8.6 MG/1
1 TABLET ORAL DAILY PRN
Status: DISCONTINUED | OUTPATIENT
Start: 2025-07-12 | End: 2025-07-14

## 2025-07-12 RX ORDER — SODIUM CHLORIDE 0.9 % (FLUSH) 0.9 %
5-40 SYRINGE (ML) INJECTION EVERY 12 HOURS SCHEDULED
Status: DISCONTINUED | OUTPATIENT
Start: 2025-07-12 | End: 2025-07-15

## 2025-07-12 RX ORDER — FENTANYL CITRATE 50 UG/ML
50 INJECTION, SOLUTION INTRAMUSCULAR; INTRAVENOUS ONCE
Status: COMPLETED | OUTPATIENT
Start: 2025-07-12 | End: 2025-07-12

## 2025-07-12 RX ORDER — METHOCARBAMOL 750 MG/1
750 TABLET, FILM COATED ORAL EVERY 6 HOURS
Status: DISCONTINUED | OUTPATIENT
Start: 2025-07-12 | End: 2025-07-16 | Stop reason: HOSPADM

## 2025-07-12 RX ORDER — SODIUM CHLORIDE, SODIUM LACTATE, POTASSIUM CHLORIDE, AND CALCIUM CHLORIDE .6; .31; .03; .02 G/100ML; G/100ML; G/100ML; G/100ML
1000 INJECTION, SOLUTION INTRAVENOUS ONCE
Status: COMPLETED | OUTPATIENT
Start: 2025-07-12 | End: 2025-07-12

## 2025-07-12 RX ORDER — SODIUM CHLORIDE 9 MG/ML
INJECTION, SOLUTION INTRAVENOUS CONTINUOUS
Status: DISCONTINUED | OUTPATIENT
Start: 2025-07-12 | End: 2025-07-13

## 2025-07-12 RX ORDER — ACETAMINOPHEN 500 MG
1000 TABLET ORAL EVERY 8 HOURS SCHEDULED
Status: DISCONTINUED | OUTPATIENT
Start: 2025-07-12 | End: 2025-07-16 | Stop reason: HOSPADM

## 2025-07-12 RX ORDER — ONDANSETRON 2 MG/ML
4 INJECTION INTRAMUSCULAR; INTRAVENOUS EVERY 6 HOURS PRN
Status: DISCONTINUED | OUTPATIENT
Start: 2025-07-12 | End: 2025-07-16 | Stop reason: HOSPADM

## 2025-07-12 RX ORDER — SODIUM CHLORIDE 9 MG/ML
INJECTION, SOLUTION INTRAVENOUS PRN
Status: DISCONTINUED | OUTPATIENT
Start: 2025-07-12 | End: 2025-07-15

## 2025-07-12 RX ORDER — GABAPENTIN 300 MG/1
300 CAPSULE ORAL EVERY 8 HOURS
Status: DISCONTINUED | OUTPATIENT
Start: 2025-07-12 | End: 2025-07-16 | Stop reason: HOSPADM

## 2025-07-12 RX ORDER — POLYETHYLENE GLYCOL 3350 17 G/17G
17 POWDER, FOR SOLUTION ORAL DAILY
Status: DISCONTINUED | OUTPATIENT
Start: 2025-07-13 | End: 2025-07-16 | Stop reason: HOSPADM

## 2025-07-12 RX ORDER — ONDANSETRON 4 MG/1
4 TABLET, ORALLY DISINTEGRATING ORAL EVERY 6 HOURS PRN
Status: DISCONTINUED | OUTPATIENT
Start: 2025-07-12 | End: 2025-07-16 | Stop reason: HOSPADM

## 2025-07-12 RX ORDER — OXYCODONE HYDROCHLORIDE 5 MG/1
5 TABLET ORAL EVERY 4 HOURS PRN
Refills: 0 | Status: DISCONTINUED | OUTPATIENT
Start: 2025-07-12 | End: 2025-07-16 | Stop reason: HOSPADM

## 2025-07-12 RX ADMIN — FENTANYL CITRATE 50 MCG: 50 INJECTION, SOLUTION INTRAMUSCULAR; INTRAVENOUS at 22:03

## 2025-07-12 RX ADMIN — SODIUM CHLORIDE, SODIUM LACTATE, POTASSIUM CHLORIDE, AND CALCIUM CHLORIDE 1000 ML: .6; .31; .03; .02 INJECTION, SOLUTION INTRAVENOUS at 20:51

## 2025-07-12 RX ADMIN — FENTANYL CITRATE 50 MCG: 50 INJECTION INTRAMUSCULAR; INTRAVENOUS at 21:41

## 2025-07-12 RX ADMIN — FENTANYL CITRATE 50 MCG: 50 INJECTION INTRAMUSCULAR; INTRAVENOUS at 19:58

## 2025-07-12 ASSESSMENT — PAIN SCALES - GENERAL
PAINLEVEL_OUTOF10: 10

## 2025-07-12 ASSESSMENT — PAIN DESCRIPTION - ORIENTATION: ORIENTATION: LEFT;LOWER

## 2025-07-12 ASSESSMENT — PAIN DESCRIPTION - LOCATION: LOCATION: LEG

## 2025-07-13 PROBLEM — S82.862A MAISONNEUVE FRACTURE OF FIBULA, LEFT, CLOSED, INITIAL ENCOUNTER: Status: ACTIVE | Noted: 2025-07-13

## 2025-07-13 LAB
AMPHET UR QL SCN: NEGATIVE
BARBITURATES UR QL SCN: NEGATIVE
BENZODIAZ UR QL: NEGATIVE
CA-I BLD-SCNC: 1.07 MMOL/L (ref 1.13–1.33)
CANNABINOIDS UR QL SCN: NEGATIVE
COCAINE UR QL SCN: NEGATIVE
ETHANOL PERCENT: 0.12 %
ETHANOLAMINE SERPL-MCNC: 120 MG/DL (ref 0–0.08)
FENTANYL UR QL: POSITIVE
METHADONE UR QL: NEGATIVE
OPIATES UR QL SCN: NEGATIVE
OXYCODONE UR QL SCN: POSITIVE
PCP UR QL SCN: NEGATIVE
PHOSPHATE SERPL-MCNC: 3.2 MG/DL (ref 2.5–4.5)
TEST INFORMATION: ABNORMAL

## 2025-07-13 PROCEDURE — 82330 ASSAY OF CALCIUM: CPT

## 2025-07-13 PROCEDURE — 6360000002 HC RX W HCPCS

## 2025-07-13 PROCEDURE — 2580000003 HC RX 258

## 2025-07-13 PROCEDURE — 80307 DRUG TEST PRSMV CHEM ANLYZR: CPT

## 2025-07-13 PROCEDURE — 2500000003 HC RX 250 WO HCPCS

## 2025-07-13 PROCEDURE — 1200000000 HC SEMI PRIVATE

## 2025-07-13 PROCEDURE — 84100 ASSAY OF PHOSPHORUS: CPT

## 2025-07-13 PROCEDURE — G0480 DRUG TEST DEF 1-7 CLASSES: HCPCS

## 2025-07-13 PROCEDURE — 6370000000 HC RX 637 (ALT 250 FOR IP)

## 2025-07-13 PROCEDURE — 99231 SBSQ HOSP IP/OBS SF/LOW 25: CPT | Performed by: SURGERY

## 2025-07-13 PROCEDURE — 94761 N-INVAS EAR/PLS OXIMETRY MLT: CPT

## 2025-07-13 PROCEDURE — 2700000000 HC OXYGEN THERAPY PER DAY

## 2025-07-13 PROCEDURE — 94640 AIRWAY INHALATION TREATMENT: CPT

## 2025-07-13 RX ORDER — ERGOCALCIFEROL 1.25 MG/1
50000 CAPSULE, LIQUID FILLED ORAL WEEKLY
Qty: 12 CAPSULE | Refills: 1 | Status: SHIPPED | OUTPATIENT
Start: 2025-07-13

## 2025-07-13 RX ORDER — ESCITALOPRAM OXALATE 20 MG/1
20 TABLET ORAL DAILY
COMMUNITY

## 2025-07-13 RX ORDER — BUDESONIDE, GLYCOPYRROLATE, AND FORMOTEROL FUMARATE 160; 9; 4.8 UG/1; UG/1; UG/1
2 AEROSOL, METERED RESPIRATORY (INHALATION) 2 TIMES DAILY
COMMUNITY

## 2025-07-13 RX ORDER — PHENOBARBITAL 32.4 MG/1
32.4 TABLET ORAL 4 TIMES DAILY
Status: COMPLETED | OUTPATIENT
Start: 2025-07-13 | End: 2025-07-13

## 2025-07-13 RX ORDER — PHENOBARBITAL 32.4 MG/1
32.4 TABLET ORAL EVERY 6 HOURS PRN
Status: ACTIVE | OUTPATIENT
Start: 2025-07-13 | End: 2025-07-15

## 2025-07-13 RX ORDER — ESCITALOPRAM OXALATE 10 MG/1
20 TABLET ORAL DAILY
Status: DISCONTINUED | OUTPATIENT
Start: 2025-07-13 | End: 2025-07-16 | Stop reason: HOSPADM

## 2025-07-13 RX ORDER — PHENOBARBITAL 16.2 MG/1
16.2 TABLET ORAL 2 TIMES DAILY
Status: COMPLETED | OUTPATIENT
Start: 2025-07-15 | End: 2025-07-15

## 2025-07-13 RX ORDER — BUDESONIDE AND FORMOTEROL FUMARATE DIHYDRATE 160; 4.5 UG/1; UG/1
2 AEROSOL RESPIRATORY (INHALATION)
Status: DISCONTINUED | OUTPATIENT
Start: 2025-07-13 | End: 2025-07-16 | Stop reason: HOSPADM

## 2025-07-13 RX ORDER — PHENOBARBITAL 32.4 MG/1
32.4 TABLET ORAL 2 TIMES DAILY
Status: COMPLETED | OUTPATIENT
Start: 2025-07-14 | End: 2025-07-14

## 2025-07-13 RX ORDER — PHENOBARBITAL 16.2 MG/1
16.2 TABLET ORAL EVERY 6 HOURS PRN
Status: ACTIVE | OUTPATIENT
Start: 2025-07-15 | End: 2025-07-16

## 2025-07-13 RX ORDER — CALCIUM GLUCONATE 20 MG/ML
2000 INJECTION, SOLUTION INTRAVENOUS ONCE
Status: COMPLETED | OUTPATIENT
Start: 2025-07-13 | End: 2025-07-13

## 2025-07-13 RX ORDER — ALBUTEROL SULFATE 0.83 MG/ML
2.5 SOLUTION RESPIRATORY (INHALATION) EVERY 6 HOURS PRN
Status: DISCONTINUED | OUTPATIENT
Start: 2025-07-13 | End: 2025-07-16 | Stop reason: HOSPADM

## 2025-07-13 RX ORDER — LEVOTHYROXINE SODIUM 75 UG/1
75 TABLET ORAL DAILY
Status: DISCONTINUED | OUTPATIENT
Start: 2025-07-13 | End: 2025-07-16 | Stop reason: HOSPADM

## 2025-07-13 RX ADMIN — CALCIUM GLUCONATE 2000 MG: 20 INJECTION, SOLUTION INTRAVENOUS at 08:16

## 2025-07-13 RX ADMIN — PHENOBARBITAL 32.4 MG: 32.4 TABLET ORAL at 12:11

## 2025-07-13 RX ADMIN — SODIUM CHLORIDE: 0.9 INJECTION, SOLUTION INTRAVENOUS at 00:10

## 2025-07-13 RX ADMIN — ACETAMINOPHEN 1000 MG: 500 TABLET ORAL at 22:46

## 2025-07-13 RX ADMIN — SODIUM CHLORIDE, PRESERVATIVE FREE 10 ML: 5 INJECTION INTRAVENOUS at 19:59

## 2025-07-13 RX ADMIN — GABAPENTIN 300 MG: 300 CAPSULE ORAL at 00:05

## 2025-07-13 RX ADMIN — ESCITALOPRAM OXALATE 20 MG: 10 TABLET ORAL at 17:01

## 2025-07-13 RX ADMIN — GABAPENTIN 300 MG: 300 CAPSULE ORAL at 13:57

## 2025-07-13 RX ADMIN — PHENOBARBITAL 32.4 MG: 32.4 TABLET ORAL at 17:01

## 2025-07-13 RX ADMIN — METHOCARBAMOL 750 MG: 750 TABLET ORAL at 00:05

## 2025-07-13 RX ADMIN — ENOXAPARIN SODIUM 30 MG: 100 INJECTION SUBCUTANEOUS at 00:06

## 2025-07-13 RX ADMIN — METHOCARBAMOL 750 MG: 750 TABLET ORAL at 12:11

## 2025-07-13 RX ADMIN — OXYCODONE 5 MG: 5 TABLET ORAL at 08:07

## 2025-07-13 RX ADMIN — ACETAMINOPHEN 1000 MG: 500 TABLET ORAL at 13:57

## 2025-07-13 RX ADMIN — METHOCARBAMOL 750 MG: 750 TABLET ORAL at 06:23

## 2025-07-13 RX ADMIN — GABAPENTIN 300 MG: 300 CAPSULE ORAL at 22:46

## 2025-07-13 RX ADMIN — ACETAMINOPHEN 1000 MG: 500 TABLET ORAL at 00:05

## 2025-07-13 RX ADMIN — TIOTROPIUM BROMIDE INHALATION SPRAY 5 MCG: 3.12 SPRAY, METERED RESPIRATORY (INHALATION) at 15:38

## 2025-07-13 RX ADMIN — ENOXAPARIN SODIUM 30 MG: 100 INJECTION SUBCUTANEOUS at 19:58

## 2025-07-13 RX ADMIN — METHOCARBAMOL 750 MG: 750 TABLET ORAL at 17:01

## 2025-07-13 RX ADMIN — LEVOTHYROXINE SODIUM 75 MCG: 0.07 TABLET ORAL at 15:48

## 2025-07-13 RX ADMIN — OXYCODONE 5 MG: 5 TABLET ORAL at 15:47

## 2025-07-13 RX ADMIN — METHOCARBAMOL 750 MG: 750 TABLET ORAL at 22:46

## 2025-07-13 RX ADMIN — ACETAMINOPHEN 1000 MG: 500 TABLET ORAL at 06:23

## 2025-07-13 RX ADMIN — PHENOBARBITAL 32.4 MG: 32.4 TABLET ORAL at 19:58

## 2025-07-13 RX ADMIN — GABAPENTIN 300 MG: 300 CAPSULE ORAL at 06:23

## 2025-07-13 RX ADMIN — PHENOBARBITAL 32.4 MG: 32.4 TABLET ORAL at 10:00

## 2025-07-13 RX ADMIN — SODIUM CHLORIDE: 0.9 INJECTION, SOLUTION INTRAVENOUS at 06:30

## 2025-07-13 RX ADMIN — BUDESONIDE AND FORMOTEROL FUMARATE DIHYDRATE 2 PUFF: 160; 4.5 AEROSOL RESPIRATORY (INHALATION) at 20:01

## 2025-07-13 ASSESSMENT — PAIN DESCRIPTION - DESCRIPTORS
DESCRIPTORS: SHARP
DESCRIPTORS: ACHING
DESCRIPTORS: SHARP
DESCRIPTORS: ACHING;SHARP

## 2025-07-13 ASSESSMENT — PAIN DESCRIPTION - LOCATION
LOCATION: ANKLE
LOCATION: ANKLE
LOCATION: ANKLE;LEG
LOCATION: LEG
LOCATION: LEG

## 2025-07-13 ASSESSMENT — PAIN SCALES - GENERAL
PAINLEVEL_OUTOF10: 8

## 2025-07-13 ASSESSMENT — PAIN DESCRIPTION - ORIENTATION
ORIENTATION: LEFT
ORIENTATION: LEFT;LOWER
ORIENTATION: LEFT
ORIENTATION: LEFT;LOWER
ORIENTATION: LEFT

## 2025-07-13 ASSESSMENT — PAIN DESCRIPTION - PAIN TYPE: TYPE: ACUTE PAIN

## 2025-07-13 NOTE — ED NOTES
C-collar removed by Dr. Linares.   
Patient presents to ED via EMS on stretcher with complaints of fall.   Patient stated she was at the pool today when she tripped over a step and fell.   Patient stated she drank 6 beers today.   Patient is complaining of pain on her left lower leg.   Patient presents with C-collar in place and splint on left lower leg.   Patient expressed feelings of frustration with having the C-collar on, stated she did not hurt her neck and she does not need the C-collar.   Patient left lower leg has a visible deformity.   Patient stated no LOC.   Patient stated she does not take blood thinners.  Patient placed on continuous cardiac monitor, bp and pulse ox. IV established, blood work drawn.   Patient in NAD, A/Ox4, call light within reach, bed in lowest position.   
Pt placed on 1L NC for SPO2 91% on RA.  
A1C 5.0   eAG (mg/dL) 97 [KM]   2327 Patient admitted. [KM]      ED Course User Index  [KM] Juan Daniel MD          Skin Assessment:        Pain Score:  Pain Assessment  Pain Level: 10  Pain Location: Leg  Pain Orientation: Left, Lower      SOCIAL HISTORY       Social History     Socioeconomic History   • Marital status: Single   Tobacco Use   • Smoking status: Every Day     Types: Cigarettes   • Smokeless tobacco: Never   Substance and Sexual Activity   • Alcohol use: Yes     Comment: 2-4 times per week   • Drug use: Never     Social Drivers of Health     Financial Resource Strain: Medium Risk (4/8/2025)    Received from [facility]    Overall Financial Resource Strain (CARDIA)    • Difficulty of Paying Living Expenses: Somewhat hard   Food Insecurity: Food Insecurity Present (4/8/2025)    Received from [facility]    Hunger Vital Sign    • Within the past 12 months, you worried that your food would run out before you got the money to buy more.: Sometimes true    • Within the past 12 months, the food you bought just didn't last and you didn't have money to get more.: Sometimes true   Transportation Needs: No Transportation Needs (4/8/2025)    Received from [facility]    Transportation    • In the past 12 months, has lack of transportation kept you from medical appointments or from getting medications?: No    • In the past 12 months, has lack of transportation kept you from meetings, work, or from getting things needed for daily living?: No   Physical Activity: Insufficiently Active (4/8/2025)    Received from [facility]    Exercise Vital Sign    • Days of Exercise per Week: 3 days    • Minutes of Exercise per Session: 20 min   Stress: Stress Concern Present (4/8/2025)    Received from [facility]    [employer] [place] [employer] - Occupational Stress Questionnaire    • Feeling of Stress : Rather much   Social Connections:

## 2025-07-13 NOTE — CONSULTS
Orthopaedic Surgery Consult  (Dr. Stewart)    Time of Evaluation: 9:15 PM    CC/Reason for consult: Left severely comminuted and displaced tibial shaft fracture    HPI:      The patient is a 69 y.o. female that presents to the [facility] emergency department after sustaining a fall from what she claims to be a 4 inch step.  The patient was highly intoxicated at the time of this accident.  The patient denies hitting their head or losing consciousness in the ordeal.  The patient denies experiencing pain to any additional region of the body aside from the LLE.  Radiographs taken at the time of the patient's arrival to the emergency department did demonstrate a significantly displaced severely comminuted left distal third tibial shaft fracture.  Orthopedic surgery was consulted soon thereafter.    On examination, the patient was resting comfortably in bed visibly intoxicated.  The patient was ranging their unsupported left lower extremity without the perception of pain with an obvious deformity of the left tibia appreciable.  On questioning, the patient states that she was walking down a flight of stairs when she fell stepping off the last 4 inch step causing her left leg to bend backwards underneath her.  The patient had a complete inability to ambulate after this fall, although she did attempt to.  The patient has a past medical history consisting of hypothyroidism and COPD.  The patient has a past orthopedic surgical history consisting of a right ankle fracture that was treated conservatively via casting numerous years ago.  The patient's only daily medication is levothyroxine for her hypothyroidism.  The patient does endorse drinking alcohol at least 3 times weekly and having at least 5-6 drinks during each occasion.  These drinks consist of either beer and/or mixed drinks consisting of vodka.  The patient does smoke cigarettes for which she has a 1 pack a day smoking history.    Past Medical History:    Past

## 2025-07-13 NOTE — ED PROVIDER NOTES
[facility] EMERGENCY DEPARTMENT     Emergency Department     Faculty Attestation    I performed a history and physical examination of the patient and discussed management with the resident. I reviewed the resident’s note and agree with the documented findings and plan of care. Any areas of disagreement are noted on the chart. I was personally present for the key portions of any procedures. I have documented in the chart those procedures where I was not present during the key portions. I have reviewed the emergency nurses triage note. I agree with the chief complaint, past medical history, past surgical history, allergies, medications, social and family history as documented unless otherwise noted below. For Physician Assistant/ Nurse Practitioner cases/documentation I have personally evaluated this patient and have completed at least one if not all key elements of the E/M (history, physical exam, and MDM). Additional findings are as noted.    Note Started: 7:26 PM EDT    Patient arrived by EMS provides independent history trip and fall at home injuring her right leg.  States she was able to get back into the house did not wish to come but EMS did feel that because patient was intoxicated did not have capacity to refuse transport.  Patient denies hitting her head no anticoagulation.  Refusing to wear c-collar.  Patient is awake alert and oriented appropriate mild slurring of her words but otherwise intact.  Normal pupils.  No midline neck or back tenderness and still refusing to wear the c-collar.  EMS did splint lower extremity deformity just above the ankle significant bruising but no open injury distal pulses intact.  No other extremity injuries chest abdomen nontender will image      Critical Care     none    Mendoza Mabry MD, FACEP  Attending Emergency  Physician           Mendoza Mabry MD  07/12/25 1958    
     [facility] EMERGENCY DEPARTMENT  Emergency Department Encounter  Emergency Medicine Resident     Pt Name:Bella Barillas  MRN: [id number]  Birthdate 1955  Date of evaluation: 7/12/25  PCP:  Mendoza Lorenzo MD  Note Started: 7:51 PM EDT      CHIEF COMPLAINT       Chief Complaint   Patient presents with    Fall       HISTORY OF PRESENT ILLNESS  (Location/Symptom, Timing/Onset, Context/Setting, Quality, Duration, Modifying Factors, Severity.)      Bella Barillas is a 69 y.o. female who presents with left ankle pain and deformity after missing a step partially coming off of a porch at a friend's house.  Patient denies hitting her head or loss of consciousness.  Denies any neck pain.  Is not any blood thinners.  Only complaint is left ankle pain.  Patient was placed in a splint and transferred by EMS.  Does admit to drinking 6 beers today.  He does apparently have a history of chronic alcohol use that was told to the attending.  Denies any abdominal pain or back pain.  Patient is not taking medications on a regular basis.  Currently denies any chest pain, shortness of breath, abdominal pain, nausea or vomiting.    PAST MEDICAL / SURGICAL / SOCIAL / FAMILY HISTORY      has a past medical history of COPD (chronic obstructive pulmonary disease) (HCC) and Thyroid disease.     has no past surgical history on file.    Social History     Socioeconomic History    Marital status: Single     Spouse name: Not on file    Number of children: Not on file    Years of education: Not on file    Highest education level: Not on file   Occupational History    Not on file   Tobacco Use    Smoking status: Every Day     Types: Cigarettes    Smokeless tobacco: Never   Substance and Sexual Activity    Alcohol use: Yes     Comment: 2-4 times per week    Drug use: Never    Sexual activity: Not on file   Other Topics Concern    Not on file   Social History Narrative    Not on file     Social Drivers of Health     Financial Resource Strain: 
Abnormal; Notable for the following components:    Myoglobin 79 (*)     All other components within normal limits   TSH REFLEX TO FT4 - Abnormal; Notable for the following components:    TSH 21.80 (*)     All other components within normal limits   CK   HEMOGLOBIN A1C   HEPATIC FUNCTION PANEL   PROTIME-INR   APTT   T4, FREE   CBC WITH AUTO DIFFERENTIAL   BASIC METABOLIC PANEL   CALCIUM, IONIZED   MAGNESIUM   PHOSPHORUS   URINE DRUG SCREEN   ETHANOL       CT HEAD WO CONTRAST  Result Date: 7/12/2025  EXAMINATION: CT OF THE CERVICAL SPINE WITHOUT CONTRAST; CT OF THE HEAD WITHOUT CONTRAST 7/12/2025 10:40 pm TECHNIQUE: CT of the cervical spine was performed without the administration of intravenous contrast. Multiplanar reformatted images are provided for review. Automated exposure control, iterative reconstruction, and/or weight based adjustment of the mA/kV was utilized to reduce the radiation dose to as low as reasonably achievable.; CT of the head was performed without the administration of intravenous contrast. Automated exposure control, iterative reconstruction, and/or weight based adjustment of the mA/kV was utilized to reduce the radiation dose to as low as reasonably achievable. COMPARISON: None. HISTORY: ORDERING SYSTEM PROVIDED HISTORY: fall, intoxicated TECHNOLOGIST PROVIDED HISTORY: fall, intoxicated Decision Support Exception - unselect if not a suspected or confirmed emergency medical condition->Emergency Medical Condition (MA) Reason for Exam: fall, intoxicated CT BRAIN BRAIN/VENTRICLES: There is no acute intracranial hemorrhage, mass effect or midline shift.  No abnormal extra-axial fluid collection.  The gray-white differentiation is maintained without evidence of an acute infarct.  There is no evidence of hydrocephalus. Ventricles are within normal limits.  Artifact in the right frontal lobe laterally. ORBITS: The visualized portion of the orbits demonstrate no acute abnormality.  Lenses are

## 2025-07-13 NOTE — PROGRESS NOTES
PROGRESS NOTE          PATIENT NAME: Bella Barillas  MEDICAL RECORD NO. [id number]  DATE: 2025  PRIMARY CARE PHYSICIAN: Mendoza Lorenzo MD    HD: # 1    ASSESSMENT    Patient Active Problem List   Diagnosis    Closed disp oblique fracture of shaft of left fibula with malunion    Maisonneuve fracture of fibula, left, closed, initial encounter       MEDICAL DECISION MAKING AND PLAN    69 years old female fall from standing height sustained with left tibial and fibula fracture    Diagnosis: Left comminuted and mildly displaced fracture of tibial and fibular shaft              Plan: ortho consult, left leg in posterior long splint, pain management   -NWB  to the LLE    -Plan for OR 7/15/2025 with Dr. Shen        Chief Complaint: \"10 okay\"    SUBJECTIVE    Patient is seen and examined this morning, no acute event overnight.  Patient was started regular diet this morning as he was not able to go to surgery due to scheduling issue.  Orthopedic surgery team is planning to take patient for surgical intervention on the 7/15      OBJECTIVE  VITALS: Temp: Temp: 98.2 °F (36.8 °C)Temp  Av.3 °F (36.8 °C)  Min: 98.2 °F (36.8 °C)  Max: 98.6 °F (37 °C) BP Systolic (24hrs), Av , Min:86 , Max:118   Diastolic (24hrs), Av, Min:44, Max:76   Pulse Pulse  Av.9  Min: 59  Max: 93 Resp Resp  Av.6  Min: 13  Max: 22 Pulse ox SpO2  Av.1 %  Min: 89 %  Max: 100 %  Constitutional:       General: She is not in acute distress.     Appearance: She is normal weight. She is not ill-appearing, toxic-appearing or diaphoretic.   HENT:      Head: Normocephalic and atraumatic.      Right Ear: External ear normal.      Left Ear: External ear normal.      Nose: Nose normal. No congestion or rhinorrhea.      Mouth/Throat:      Mouth: Mucous membranes are moist.      Pharynx: Oropharynx is clear.   Eyes:      General: No scleral icterus.     Extraocular Movements: Extraocular movements intact.      Conjunctiva/sclera:

## 2025-07-13 NOTE — PLAN OF CARE
Problem: Discharge Planning  Goal: Discharge to home or other facility with appropriate resources  7/13/2025 1339 by Dorcas Elias RN  Outcome: Progressing  7/13/2025 0645 by Amanda Sood RN  Outcome: Progressing     Problem: Pain  Goal: Verbalizes/displays adequate comfort level or baseline comfort level  7/13/2025 1339 by Dorcas Elias RN  Outcome: Progressing  7/13/2025 0645 by Amanda Sood RN  Outcome: Progressing     Problem: Safety - Adult  Goal: Free from fall injury  7/13/2025 1339 by Dorcas Elias RN  Outcome: Progressing  7/13/2025 0645 by Amanda Sood RN  Outcome: Progressing     Problem: ABCDS Injury Assessment  Goal: Absence of physical injury  7/13/2025 1339 by Dorcas Elias RN  Outcome: Progressing  7/13/2025 0645 by Amanda Sood RN  Outcome: Progressing     Problem: Neurosensory - Adult  Goal: Achieves stable or improved neurological status  Outcome: Progressing     Problem: Skin/Tissue Integrity - Adult  Goal: Skin integrity remains intact  Outcome: Progressing     Problem: Musculoskeletal - Adult  Goal: Return mobility to safest level of function  Outcome: Progressing     Problem: Genitourinary - Adult  Goal: Absence of urinary retention  Outcome: Progressing

## 2025-07-13 NOTE — PLAN OF CARE
Problem: Respiratory - Adult  Goal: Achieves optimal ventilation and oxygenation  Outcome: Progressing  Flowsheets (Taken 7/13/2025 0953)  Achieves optimal ventilation and oxygenation:   Assess for changes in respiratory status   Oxygen supplementation based on oxygen saturation or arterial blood gases   Assess and instruct to report shortness of breath or any respiratory difficulty   Respiratory therapy support as indicated

## 2025-07-13 NOTE — PROGRESS NOTES
[facility] [place] [facility]  PROGRESS NOTE    Shift date: 7/13/2025  Shift day: Sunday   Shift # 2    Room # 0547/0547-01   Name: Bella Barillas                Taoist: Scientologist  Place of Mormonism: none    Referral: Routine Visit    Admit Date & Time: 7/12/2025  7:25 PM    Assessment:  Bella Barillas is a 69 y.o. female in the hospital because of Closed disp oblique fracture of shaft of left fibula with malunion. Upon entering the room writer observes pt is awake and receptive to visit.       Intervention:  Writer introduced self and title as     inquired how pt was feeling. Pt reported a lot of pain due to broken leg. Pt explained how it happened. Pt lives alone and has a cat.  inquired if she feels support from those around her. Pt said she does feel support from her son, and from two good friends who visited earlier.  inquired about liam background. Pt shared that she grew up Scientologist, though she no longer attends Buddhist. She still has liam which helps her cope during difficult times.  offered prayer w/ pt.    Outcome:  Pt expressed gratitude for visit and prayer.     Plan:  Chaplains will remain available to offer spiritual and emotional support as needed.      Electronically signed by Clemente Herring, Intern, on 7/13/2025 at 5:20 PM.  [facility]  [phone]

## 2025-07-13 NOTE — H&P
TRAUMA H&P/CONSULT    PATIENT NAME: Bella Barillas  YOB: 1955  MEDICAL RECORD NO. [id number]   DATE: 7/12/2025  PRIMARY CARE PHYSICIAN: Mendoza Lorenzo MD  PATIENT EVALUATED AT THE REQUEST OF : Raghavendra MARADIAGA   Trauma Consult-Time at bedside 0930      IMPRESSION AND PLAN:       Diagnosis: Comminuted and mildly displaced fracture of tibial and fibular shaft   Plan: ortho consult, left leg in posterior long splint, OR tmrw, npo midnight, pain management        If intracranial hemorrhage is present, is it a:  [] BIG 1  [] BIG 2  [] BIG 3  If chest wall injury: Rib score___    CONSULT SERVICES    Orthopedic Surgery      HISTORY:     Chief Complaint:  \"Fall\"    GENERAL DATA  Patient information was obtained from patient.  History/Exam limitations: intoxication.  Injury Date: 07/12/25   Approximate Injury Time: unk            SETTING OF TRAUMATIC EVENT   Location : Home    MECHANISM OF INJURY    Fall Down 4 steps      HISTORY:     Bella Barillas is a female that presented to the Emergency Department following a fall down 4 steps at home. She complains of left ankle pain. She did not hit her head or lose consciousness. She endorses alcohol ingestion, around 6 beers. She is not on blood thinners    Traumatic loss of Consciousness: No    Total Fluids Given Prior To Arrival  mL    MEDICATIONS:   []  None     []  Information not available due to exam limitations documented above    Prior to Admission medications    Medication Sig Start Date End Date Taking? Authorizing Provider   albuterol (ACCUNEB) 0.63 MG/3ML nebulizer solution Take 3 mLs by nebulization every 6 hours as needed for Wheezing 3/20/21   Goldberger, Erica B, MD   levothyroxine (SYNTHROID) 75 MCG tablet Take 75 mcg by mouth Daily    ProviderAruna MD       ALLERGIES:   []  None    []   Information not available due to exam limitations documented above     Patient has no known allergies.    PAST MEDICAL/SURGICAL HISTORY: []  None   []

## 2025-07-13 NOTE — PROGRESS NOTES
Orthopedic Progress Note    Patient:  Bella Barillas  YOB: 1955     69 y.o. female    HPI:       The patient is a 69 y.o. female that presents to the [facility] emergency department after sustaining a fall from what she claims to be a 4 inch step.  The patient was highly intoxicated at the time of this accident.  The patient denies hitting their head or losing consciousness in the ordeal.  The patient denies experiencing pain to any additional region of the body aside from the LLE.  Radiographs taken at the time of the patient's arrival to the emergency department did demonstrate a significantly displaced severely comminuted left distal third tibial shaft fracture.  Orthopedic surgery was consulted soon thereafter.     On examination, the patient was resting comfortably in bed visibly intoxicated.  The patient was ranging their unsupported left lower extremity without the perception of pain with an obvious deformity of the left tibia appreciable.  On questioning, the patient states that she was walking down a flight of stairs when she fell stepping off the last 4 inch step causing her left leg to bend backwards underneath her.  The patient had a complete inability to ambulate after this fall, although she did attempt to.  The patient has a past medical history consisting of hypothyroidism and COPD.  The patient has a past orthopedic surgical history consisting of a right ankle fracture that was treated conservatively via casting numerous years ago.  The patient's only daily medication is levothyroxine for her hypothyroidism.  The patient does endorse drinking alcohol at least 3 times weekly and having at least 5-6 drinks during each occasion.  These drinks consist of either beer and/or mixed drinks consisting of vodka.  The patient does smoke cigarettes for which she has a 1 pack a day smoking history.    Subjective:  Patient seen and examined this morning. No complaints or concerns. No

## 2025-07-14 LAB
ANION GAP SERPL CALCULATED.3IONS-SCNC: 9 MMOL/L (ref 9–16)
BASOPHILS # BLD: <0.03 K/UL (ref 0–0.2)
BASOPHILS NFR BLD: 0 % (ref 0–2)
BUN SERPL-MCNC: 11 MG/DL (ref 8–23)
CALCIUM SERPL-MCNC: 8.7 MG/DL (ref 8.6–10.4)
CHLORIDE SERPL-SCNC: 104 MMOL/L (ref 98–107)
CO2 SERPL-SCNC: 23 MMOL/L (ref 20–31)
CREAT SERPL-MCNC: 0.9 MG/DL (ref 0.6–0.9)
EOSINOPHIL # BLD: 0.14 K/UL (ref 0–0.44)
EOSINOPHILS RELATIVE PERCENT: 2 % (ref 1–4)
ERYTHROCYTE [DISTWIDTH] IN BLOOD BY AUTOMATED COUNT: 13.5 % (ref 11.8–14.4)
GFR, ESTIMATED: 70 ML/MIN/1.73M2
GLUCOSE SERPL-MCNC: 117 MG/DL (ref 74–99)
HCT VFR BLD AUTO: 30.9 % (ref 36.3–47.1)
HGB BLD-MCNC: 10.2 G/DL (ref 11.9–15.1)
IMM GRANULOCYTES # BLD AUTO: <0.03 K/UL (ref 0–0.3)
IMM GRANULOCYTES NFR BLD: 0 %
LYMPHOCYTES NFR BLD: 1.42 K/UL (ref 1.1–3.7)
LYMPHOCYTES RELATIVE PERCENT: 23 % (ref 24–43)
MAGNESIUM SERPL-MCNC: 1.9 MG/DL (ref 1.6–2.4)
MCH RBC QN AUTO: 34.8 PG (ref 25.2–33.5)
MCHC RBC AUTO-ENTMCNC: 33 G/DL (ref 28.4–34.8)
MCV RBC AUTO: 105.5 FL (ref 82.6–102.9)
MONOCYTES NFR BLD: 0.74 K/UL (ref 0.1–1.2)
MONOCYTES NFR BLD: 12 % (ref 3–12)
NEUTROPHILS NFR BLD: 63 % (ref 36–65)
NEUTS SEG NFR BLD: 3.97 K/UL (ref 1.5–8.1)
NRBC BLD-RTO: 0 PER 100 WBC
PLATELET # BLD AUTO: 266 K/UL (ref 138–453)
PMV BLD AUTO: 9.7 FL (ref 8.1–13.5)
POTASSIUM SERPL-SCNC: 4 MMOL/L (ref 3.7–5.3)
RBC # BLD AUTO: 2.93 M/UL (ref 3.95–5.11)
RBC # BLD: ABNORMAL 10*6/UL
SODIUM SERPL-SCNC: 136 MMOL/L (ref 136–145)
WBC OTHER # BLD: 6.3 K/UL (ref 3.5–11.3)

## 2025-07-14 PROCEDURE — 6370000000 HC RX 637 (ALT 250 FOR IP)

## 2025-07-14 PROCEDURE — 83735 ASSAY OF MAGNESIUM: CPT

## 2025-07-14 PROCEDURE — 6360000002 HC RX W HCPCS: Performed by: STUDENT IN AN ORGANIZED HEALTH CARE EDUCATION/TRAINING PROGRAM

## 2025-07-14 PROCEDURE — 2700000000 HC OXYGEN THERAPY PER DAY

## 2025-07-14 PROCEDURE — 6370000000 HC RX 637 (ALT 250 FOR IP): Performed by: NURSE PRACTITIONER

## 2025-07-14 PROCEDURE — 2500000003 HC RX 250 WO HCPCS

## 2025-07-14 PROCEDURE — 6360000002 HC RX W HCPCS

## 2025-07-14 PROCEDURE — 1200000000 HC SEMI PRIVATE

## 2025-07-14 PROCEDURE — 99231 SBSQ HOSP IP/OBS SF/LOW 25: CPT | Performed by: NURSE PRACTITIONER

## 2025-07-14 PROCEDURE — 94761 N-INVAS EAR/PLS OXIMETRY MLT: CPT

## 2025-07-14 PROCEDURE — 80048 BASIC METABOLIC PNL TOTAL CA: CPT

## 2025-07-14 PROCEDURE — 85025 COMPLETE CBC W/AUTO DIFF WBC: CPT

## 2025-07-14 PROCEDURE — 94640 AIRWAY INHALATION TREATMENT: CPT

## 2025-07-14 PROCEDURE — 36415 COLL VENOUS BLD VENIPUNCTURE: CPT

## 2025-07-14 RX ORDER — LANOLIN ALCOHOL/MO/W.PET/CERES
100 CREAM (GRAM) TOPICAL DAILY
Status: DISCONTINUED | OUTPATIENT
Start: 2025-07-14 | End: 2025-07-16 | Stop reason: HOSPADM

## 2025-07-14 RX ORDER — FOLIC ACID 1 MG/1
1 TABLET ORAL DAILY
Status: DISCONTINUED | OUTPATIENT
Start: 2025-07-14 | End: 2025-07-16 | Stop reason: HOSPADM

## 2025-07-14 RX ORDER — SODIUM CHLORIDE 9 MG/ML
INJECTION, SOLUTION INTRAVENOUS CONTINUOUS
Status: DISCONTINUED | OUTPATIENT
Start: 2025-07-15 | End: 2025-07-16

## 2025-07-14 RX ORDER — SENNOSIDES 8.6 MG/1
1 TABLET ORAL NIGHTLY
Status: DISCONTINUED | OUTPATIENT
Start: 2025-07-14 | End: 2025-07-16 | Stop reason: HOSPADM

## 2025-07-14 RX ADMIN — SODIUM CHLORIDE, PRESERVATIVE FREE 10 ML: 5 INJECTION INTRAVENOUS at 20:57

## 2025-07-14 RX ADMIN — TIOTROPIUM BROMIDE INHALATION SPRAY 5 MCG: 3.12 SPRAY, METERED RESPIRATORY (INHALATION) at 08:27

## 2025-07-14 RX ADMIN — ENOXAPARIN SODIUM 30 MG: 100 INJECTION SUBCUTANEOUS at 20:56

## 2025-07-14 RX ADMIN — GABAPENTIN 300 MG: 300 CAPSULE ORAL at 08:42

## 2025-07-14 RX ADMIN — ACETAMINOPHEN 1000 MG: 500 TABLET ORAL at 17:02

## 2025-07-14 RX ADMIN — HYDROMORPHONE HYDROCHLORIDE 0.5 MG: 1 INJECTION, SOLUTION INTRAMUSCULAR; INTRAVENOUS; SUBCUTANEOUS at 08:36

## 2025-07-14 RX ADMIN — LEVOTHYROXINE SODIUM 75 MCG: 0.07 TABLET ORAL at 05:13

## 2025-07-14 RX ADMIN — METHOCARBAMOL 750 MG: 750 TABLET ORAL at 17:02

## 2025-07-14 RX ADMIN — METHOCARBAMOL 750 MG: 750 TABLET ORAL at 23:56

## 2025-07-14 RX ADMIN — BUDESONIDE AND FORMOTEROL FUMARATE DIHYDRATE 2 PUFF: 160; 4.5 AEROSOL RESPIRATORY (INHALATION) at 19:54

## 2025-07-14 RX ADMIN — SODIUM CHLORIDE, PRESERVATIVE FREE 10 ML: 5 INJECTION INTRAVENOUS at 08:39

## 2025-07-14 RX ADMIN — PHENOBARBITAL 32.4 MG: 32.4 TABLET ORAL at 08:42

## 2025-07-14 RX ADMIN — ACETAMINOPHEN 1000 MG: 500 TABLET ORAL at 05:13

## 2025-07-14 RX ADMIN — ESCITALOPRAM OXALATE 20 MG: 10 TABLET ORAL at 08:45

## 2025-07-14 RX ADMIN — SENNOSIDES 8.6 MG: 8.6 TABLET, FILM COATED ORAL at 20:56

## 2025-07-14 RX ADMIN — ENOXAPARIN SODIUM 30 MG: 100 INJECTION SUBCUTANEOUS at 08:42

## 2025-07-14 RX ADMIN — OXYCODONE 5 MG: 5 TABLET ORAL at 12:01

## 2025-07-14 RX ADMIN — ACETAMINOPHEN 1000 MG: 500 TABLET ORAL at 20:56

## 2025-07-14 RX ADMIN — GABAPENTIN 300 MG: 300 CAPSULE ORAL at 17:02

## 2025-07-14 RX ADMIN — Medication 100 MG: at 17:02

## 2025-07-14 RX ADMIN — BUDESONIDE AND FORMOTEROL FUMARATE DIHYDRATE 2 PUFF: 160; 4.5 AEROSOL RESPIRATORY (INHALATION) at 08:27

## 2025-07-14 RX ADMIN — ALBUTEROL SULFATE 2.5 MG: 2.5 SOLUTION RESPIRATORY (INHALATION) at 16:42

## 2025-07-14 RX ADMIN — PHENOBARBITAL 32.4 MG: 32.4 TABLET ORAL at 20:56

## 2025-07-14 RX ADMIN — POLYETHYLENE GLYCOL 3350 17 G: 17 POWDER, FOR SOLUTION ORAL at 08:42

## 2025-07-14 RX ADMIN — OXYCODONE 5 MG: 5 TABLET ORAL at 17:08

## 2025-07-14 RX ADMIN — GABAPENTIN 300 MG: 300 CAPSULE ORAL at 23:56

## 2025-07-14 RX ADMIN — METHOCARBAMOL 750 MG: 750 TABLET ORAL at 12:01

## 2025-07-14 RX ADMIN — OXYCODONE 5 MG: 5 TABLET ORAL at 20:58

## 2025-07-14 RX ADMIN — METHOCARBAMOL 750 MG: 750 TABLET ORAL at 05:13

## 2025-07-14 RX ADMIN — FOLIC ACID 1 MG: 1 TABLET ORAL at 17:02

## 2025-07-14 ASSESSMENT — PAIN SCALES - GENERAL
PAINLEVEL_OUTOF10: 9
PAINLEVEL_OUTOF10: 6
PAINLEVEL_OUTOF10: 5
PAINLEVEL_OUTOF10: 6
PAINLEVEL_OUTOF10: 3
PAINLEVEL_OUTOF10: 7
PAINLEVEL_OUTOF10: 6
PAINLEVEL_OUTOF10: 7
PAINLEVEL_OUTOF10: 7

## 2025-07-14 ASSESSMENT — PAIN SCALES - WONG BAKER
WONGBAKER_NUMERICALRESPONSE: HURTS A LITTLE BIT
WONGBAKER_NUMERICALRESPONSE: NO HURT
WONGBAKER_NUMERICALRESPONSE: HURTS A LITTLE BIT

## 2025-07-14 ASSESSMENT — PAIN DESCRIPTION - LOCATION
LOCATION: LEG;ANKLE
LOCATION: LEG
LOCATION: ANKLE;LEG
LOCATION: LEG
LOCATION: ANKLE;LEG

## 2025-07-14 ASSESSMENT — PAIN DESCRIPTION - ORIENTATION
ORIENTATION: LEFT

## 2025-07-14 ASSESSMENT — PAIN DESCRIPTION - DESCRIPTORS
DESCRIPTORS: STABBING
DESCRIPTORS: SORE;THROBBING
DESCRIPTORS: ACHING;SORE;THROBBING
DESCRIPTORS: THROBBING;STABBING
DESCRIPTORS: ACHING;SORE;THROBBING

## 2025-07-14 NOTE — PLAN OF CARE
Problem: Discharge Planning  Goal: Discharge to home or other facility with appropriate resources  7/14/2025 0317 by Amanda Sood RN  Outcome: Progressing  7/13/2025 1339 by Dorcas Elias RN  Outcome: Progressing     Problem: Pain  Goal: Verbalizes/displays adequate comfort level or baseline comfort level  7/14/2025 0317 by Amanda Sood RN  Outcome: Progressing  7/13/2025 1339 by Dorcas Elias RN  Outcome: Progressing     Problem: Safety - Adult  Goal: Free from fall injury  7/14/2025 0317 by Amanda Sood RN  Outcome: Progressing  7/13/2025 1339 by Dorcas Elias RN  Outcome: Progressing     Problem: ABCDS Injury Assessment  Goal: Absence of physical injury  7/14/2025 0317 by Amanda Sood RN  Outcome: Progressing  7/13/2025 1339 by Dorcas Elias RN  Outcome: Progressing     Problem: Neurosensory - Adult  Goal: Achieves stable or improved neurological status  7/14/2025 0317 by Amanda Sood RN  Outcome: Progressing  7/13/2025 1339 by Dorcas Elias RN  Outcome: Progressing     Problem: Skin/Tissue Integrity - Adult  Goal: Skin integrity remains intact  7/14/2025 0317 by Amanda Sood RN  Outcome: Progressing  7/13/2025 1339 by Dorcas Elias RN  Outcome: Progressing     Problem: Musculoskeletal - Adult  Goal: Return mobility to safest level of function  7/14/2025 0317 by Amanda Sood RN  Outcome: Progressing  7/13/2025 1339 by Dorcas Elias RN  Outcome: Progressing     Problem: Genitourinary - Adult  Goal: Absence of urinary retention  7/14/2025 0317 by Amanda Sood RN  Outcome: Progressing  7/13/2025 1339 by Dorcas Elias RN  Outcome: Progressing     Problem: Respiratory - Adult  Goal: Achieves optimal ventilation and oxygenation  7/14/2025 0317 by Amanda Sood RN  Outcome: Progressing  7/13/2025 1645 by Becky Cannon RCADI  Outcome: Progressing  Flowsheets (Taken 7/13/2025 1645)  Achieves optimal ventilation and oxygenation:   Assess for changes

## 2025-07-14 NOTE — PROGRESS NOTES
Orthopedic Progress Note    Patient:  Bella Barillas  YOB: 1955     69 y.o. female    Subjective:  Patient seen and examined this morning at bedside.  Patient states she is having some pain in her left lower extremity.. No issues overnight per nursing.  Patient states some of her pain is from inability to move LLE due to long-leg splint. Denies fever, HA, N/V, or any new numbness/tingling.  Voiding appropriately.  Patient has yet to work with physical therapy.  Patient remains in long-leg splint with dressings clean dry and intact.    Vitals reviewed, afebrile with consistently soft blood pressure.    Objective:   Vitals:    07/14/25 0326   BP: (!) 89/65   Pulse: 59   Resp: 17   Temp:    SpO2: 100%     Gen: NAD, cooperative    Cardiovascular: Regular rate on the monitor  Respiratory: No acute respiratory distress, breathing comfortably    Orthopedic Exam  LLE:  Posterior long-leg splint on patient's LLE.  Patient tender to palpation about fracture site. Compartments soft and easily compressible.  Patient is able to flex and extend all toes.  Patient is sensitive to touch on all exposed digits.  Toes warm well-perfused with BCR of exposed digits.      Recent Labs     07/12/25  2125   WBC 7.6   HGB 12.5   HCT 38.7      INR 0.9   *   K 4.0   BUN 9   CREATININE 1.0*   GLUCOSE 85      Meds:   DVT ppx: Lovenox  See rec for complete list    Impression 69 y.o. female who is being seen for:    - Left distal tibial shaft fracture  - Left distal fibula fracture  - Left proximal fibula fracture     Plan  - Plan for OR 7/15/2025 with Dr. Shen   - Ancef OCTOR for 7/15  - Splint on LLE. Okay to reinforce/maintain by nursing if necessary. Please notify Ortho if saturated.  - NWB to the LLE  - Pain control and medical management per primary: Trauma surgery  - Multimodal pain control ordered   - DVT ppx: Lovenox 30 mg twice daily. At minimum recommend ASA 81mg BID for 30 days post-op on discharge

## 2025-07-14 NOTE — CARE COORDINATION
Case Management Assessment  Initial Evaluation    Date/Time of Evaluation: 7/14/2025 8:50AM  Assessment Completed by: Laverne Thibodeaux RN    If patient is discharged prior to next notation, then this note serves as note for discharge by case management.    Patient Name: Bella Barillas                   YOB: 1955  Diagnosis: Closed disp oblique fracture of shaft of left fibula with malunion [S82.432P]  Fall, initial encounter [W19.XXXA]  Maisonneuve fracture of fibula, left, closed, initial encounter [S82.862A]                   Date / Time: 7/12/2025  7:25 PM    Patient Admission Status: Inpatient   Readmission Risk (Low < 19, Mod (19-27), High > 27): Readmission Risk Score: 9.2    Current PCP: Mendoza Lorenzo MD  PCP verified by CM? (P) Yes (Mendoza Lorenzo MD)    Chart Reviewed: Yes      History Provided by: Patient  Patient Orientation: Alert and Oriented, Person, Place, Situation    Patient Cognition: Alert    Hospitalization in the last 30 days (Readmission):  No    If yes, Readmission Assessment in  Navigator will be completed.    Advance Directives:      Code Status: Full Code   Patient's Primary Decision Maker is: (P)  (states has DPOA for HC with son Yahir listed)      Discharge Planning:    Patient lives with: (P) Alone Type of Home: (P) House  Primary Care Giver: Self  Patient Support Systems include: Friends/Neighbors, Children   Current Financial resources: (P) Medicare  Current community resources:    Current services prior to admission: (P) None            Current DME:              Type of Home Care services:  (P) None    ADLS  Prior functional level: (P) Independent in ADLs/IADLs  Current functional level: (P) Assistance with the following:, Toileting, Mobility    PT AM-PAC:   /24  OT AM-PAC:   /24    Family can provide assistance at DC: (P) No  Would you like Case Management to discuss the discharge plan with any other family members/significant others, and if so, who? (P) No  Plans to

## 2025-07-14 NOTE — PROGRESS NOTES
[facility] Inpatient Progress Note  ROBERTO Harvey   7/14/2025    Bella Barillas  1955  [id number]      Time spent with Patient:  0 minutes  Time spent with Family:  0 minutes    This writer was unable to complete screen or therapy services with patient at bedside at this time due to the following:  Asleep / Would not wake    Clinician following, will assess at later point

## 2025-07-14 NOTE — CARE COORDINATION
Met with pt to complete an SBIRT.  Pt states that she fell down some stairs after having a few drinks.  Pt is alert and oriented.  She states that she drinks about 3 times a week and drinks up to 5 drinks.  She states that she doesn't use any drugs.  She denies depression symptoms.  Pt declined any resources and feels that her drinking is under control.          Alcohol Screening and Brief Intervention        No results for input(s): \"ALC\" in the last 72 hours.    Alcohol Pre-screening     (WOMEN ONLY) How many times in the past year have you had 4 or more drinks in a day?: 1 or more  TOTAL SCORE:: 13    Drug Pre-Screening        Drug Screening DAST       Mood Pre-Screening (PHQ-2)  During the past 2 weeks, have you been bothered by, feeling down, depressed or hopeless?  No        I have interviewed Bella Barillas, [id number] regarding  Her alcohol consumption/drug use and risk for excessive use. Screenings were positive.  Patient  Declined intervention at this time.   Deferred []    Completed on: 7/14/2025   ART BONNER

## 2025-07-14 NOTE — PLAN OF CARE
Problem: Discharge Planning  Goal: Discharge to home or other facility with appropriate resources  7/14/2025 1700 by Trixie Lloyd RN  Outcome: Progressing  7/14/2025 0317 by Amanda Sood RN  Outcome: Progressing     Problem: Pain  Goal: Verbalizes/displays adequate comfort level or baseline comfort level  7/14/2025 1700 by Trixie Lloyd RN  Outcome: Progressing  7/14/2025 0317 by Amanda Sood RN  Outcome: Progressing     Problem: Safety - Adult  Goal: Free from fall injury  7/14/2025 1700 by Trixie Lloyd RN  Outcome: Progressing  7/14/2025 0317 by Amanda Sood RN  Outcome: Progressing     Problem: ABCDS Injury Assessment  Goal: Absence of physical injury  7/14/2025 1700 by Trixie Lloyd RN  Outcome: Progressing  7/14/2025 0317 by Amanda Sood RN  Outcome: Progressing     Problem: Neurosensory - Adult  Goal: Achieves stable or improved neurological status  7/14/2025 1700 by Trixie Lloyd RN  Outcome: Progressing  7/14/2025 0317 by Amanda Sood RN  Outcome: Progressing     Problem: Skin/Tissue Integrity - Adult  Goal: Skin integrity remains intact  7/14/2025 1700 by Trixie Lloyd RN  Outcome: Progressing  7/14/2025 0317 by Amanda Sood RN  Outcome: Progressing     Problem: Musculoskeletal - Adult  Goal: Return mobility to safest level of function  7/14/2025 1700 by Trixie Lloyd RN  Outcome: Progressing  7/14/2025 0317 by Amanda Sood RN  Outcome: Progressing     Problem: Genitourinary - Adult  Goal: Absence of urinary retention  7/14/2025 1700 by Trixie Lloyd RN  Outcome: Progressing  7/14/2025 0317 by Amanda Sood RN  Outcome: Progressing     Problem: Respiratory - Adult  Goal: Achieves optimal ventilation and oxygenation  7/14/2025 1700 by Trixie Lloyd RN  Outcome: Progressing  7/14/2025 0317 by Amanda Sood RN  Outcome: Progressing

## 2025-07-14 NOTE — PROGRESS NOTES
Occupational Therapy    [facility]  Occupational Therapy Not Seen Note    DATE: 2025    NAME: Bella Barillas  MRN: [id number]   : 1955      Patient not seen this date for Occupational Therapy due to:    Surgery/Procedure: Plan for OR 7/15/2025 with Dr. Shen, evaluate post-op.      Electronically signed by Vianca Moss OT on 2025 at 8:27 AM

## 2025-07-14 NOTE — PROGRESS NOTES
PROGRESS NOTE          PATIENT NAME: Bella Barillas  MEDICAL RECORD NO. [id number]  DATE: 7/14/2025    HD: # 2      DIAGNOSIS AND PLAN    Fall    L tib/fib fracture   Ortho consulted   Plan for OR 7/15   NWB LLE with splint currently on    Etoh use   Monitor for withdrawal   On phenobarbital taper   Start thiamine/folic acid    Pain management   Continue tylenol, neurontin, robaxin, prn jackie   Dc dilaudid    GI   General diet   Npo at midnight for OR   Continue glycolax   Schedule senokot    DVT proph   Continue lovenox    Dispo   Pending OR 7/15      Chief Complaint: \"pain\"    SUBJECTIVE    Patient seen at bedside. Patient states she does have pain to her leg.  Is wanting to go home but knows she has surgery scheduled for tomorrow with ortho.     OBJECTIVE  VITALS:   Vitals:    07/14/25 0906   BP:    Pulse:    Resp: 18   Temp:    SpO2:      Physical Exam  Constitutional:       General: She is awake.   HENT:      Head: Normocephalic.   Cardiovascular:      Rate and Rhythm: Normal rate.      Heart sounds: Murmur heard.   Pulmonary:      Effort: Pulmonary effort is normal.   Abdominal:      Palpations: Abdomen is soft.   Musculoskeletal:      Comments: Splint intact to LLE   Skin:     General: Skin is warm.   Neurological:      Mental Status: She is alert.      GCS: GCS eye subscore is 4. GCS verbal subscore is 5. GCS motor subscore is 6.             LAB:  CBC:   Recent Labs     07/12/25 2125 07/14/25  0943   WBC 7.6 6.3   HGB 12.5 10.2*   HCT 38.7 30.9*   .1* 105.5*    266     BMP:   Recent Labs     07/12/25 2125 07/14/25  0943   * 136   K 4.0 4.0   CL 99 104   CO2 16* 23   BUN 9 11   CREATININE 1.0* 0.9   GLUCOSE 85 117*       RADIOLOGY:  No new images      Vanesa Rosenbaum, BARI - CNP  7/14/2025, 1:03 PM

## 2025-07-14 NOTE — CARE COORDINATION
Trauma Coordinator Rounding Note  Daily check in:  I met with Bella Barillas  at bedside to review their plan of care. She was in bed asleep, but wakes to verbal stimuli.  I allowed opportunity for Bella Barillas to ask questions regarding their injuries, expected length of stay and disposition plan. Pt is eager to get home.  All questions answered to verbal satisfaction.   [x]Wounds / Injuries  [x]PT/OT/speech  [x]Incentive spirometer  [x]Diet  [x]Activity  [x]Preferred pharmacy  [x] PCP Confirmed  [x] Medical Insurance Confirmed  [] Work-related Injury  [x] Pain Management Education    DC Expectation:  Patient expects to be discharged to Home  Bedside Nurse:  I attempted to speak with the patient's assigned nurse to review the plan of care for today and provide an opportunity to ask questions or relay any concerns to the Trauma service, but she was busy with another patient.     Discharge Info:  Will have follow up with Orthopedics. Appointment to be made after surgery for follow up.   :  I provided a clinical update to the unit  and confirmed the disposition plan. Current barriers to discharge include: not medically stable for discharge due to Planned Surgical procedure    Electronically signed by Radha Salcido RN on 7/14/2025 at 4:43 PM

## 2025-07-15 ENCOUNTER — ANESTHESIA (OUTPATIENT)
Dept: OPERATING ROOM | Age: 70
DRG: 494 | End: 2025-07-15
Payer: COMMERCIAL

## 2025-07-15 ENCOUNTER — APPOINTMENT (OUTPATIENT)
Dept: GENERAL RADIOLOGY | Age: 70
DRG: 494 | End: 2025-07-15
Payer: COMMERCIAL

## 2025-07-15 ENCOUNTER — ANESTHESIA EVENT (OUTPATIENT)
Dept: OPERATING ROOM | Age: 70
DRG: 494 | End: 2025-07-15
Payer: COMMERCIAL

## 2025-07-15 PROBLEM — S82.872A: Status: ACTIVE | Noted: 2025-07-15
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
## 2025-07-15 LAB
BASOPHILS # BLD: <0.03 K/UL (ref 0–0.2)
BASOPHILS NFR BLD: 0 % (ref 0–2)
EOSINOPHIL # BLD: 0.17 K/UL (ref 0–0.44)
EOSINOPHILS RELATIVE PERCENT: 3 % (ref 1–4)
ERYTHROCYTE [DISTWIDTH] IN BLOOD BY AUTOMATED COUNT: 13.5 % (ref 11.8–14.4)
GLUCOSE BLD-MCNC: 125 MG/DL (ref 65–105)
HCT VFR BLD AUTO: 27.2 % (ref 36.3–47.1)
HCT VFR BLD AUTO: 31.1 % (ref 36.3–47.1)
HGB BLD-MCNC: 10.2 G/DL (ref 11.9–15.1)
HGB BLD-MCNC: 8.9 G/DL (ref 11.9–15.1)
IMM GRANULOCYTES # BLD AUTO: <0.03 K/UL (ref 0–0.3)
IMM GRANULOCYTES NFR BLD: 0 %
LYMPHOCYTES NFR BLD: 1.87 K/UL (ref 1.1–3.7)
LYMPHOCYTES RELATIVE PERCENT: 27 % (ref 24–43)
MAGNESIUM SERPL-MCNC: 1.9 MG/DL (ref 1.6–2.4)
MCH RBC QN AUTO: 35.2 PG (ref 25.2–33.5)
MCHC RBC AUTO-ENTMCNC: 32.8 G/DL (ref 28.4–34.8)
MCV RBC AUTO: 107.2 FL (ref 82.6–102.9)
MONOCYTES NFR BLD: 0.93 K/UL (ref 0.1–1.2)
MONOCYTES NFR BLD: 14 % (ref 3–12)
NEUTROPHILS NFR BLD: 56 % (ref 36–65)
NEUTS SEG NFR BLD: 3.87 K/UL (ref 1.5–8.1)
NRBC BLD-RTO: 0 PER 100 WBC
PLATELET # BLD AUTO: 258 K/UL (ref 138–453)
PMV BLD AUTO: 9.5 FL (ref 8.1–13.5)
RBC # BLD AUTO: 2.9 M/UL (ref 3.95–5.11)
RBC # BLD: ABNORMAL 10*6/UL
WBC OTHER # BLD: 6.9 K/UL (ref 3.5–11.3)

## 2025-07-15 PROCEDURE — 85018 HEMOGLOBIN: CPT

## 2025-07-15 PROCEDURE — 2580000003 HC RX 258

## 2025-07-15 PROCEDURE — 73590 X-RAY EXAM OF LOWER LEG: CPT

## 2025-07-15 PROCEDURE — 1200000000 HC SEMI PRIVATE

## 2025-07-15 PROCEDURE — 6370000000 HC RX 637 (ALT 250 FOR IP)

## 2025-07-15 PROCEDURE — 85014 HEMATOCRIT: CPT

## 2025-07-15 PROCEDURE — 2500000003 HC RX 250 WO HCPCS

## 2025-07-15 PROCEDURE — 82947 ASSAY GLUCOSE BLOOD QUANT: CPT

## 2025-07-15 PROCEDURE — 6360000002 HC RX W HCPCS

## 2025-07-15 PROCEDURE — 73610 X-RAY EXAM OF ANKLE: CPT

## 2025-07-15 PROCEDURE — 7100000001 HC PACU RECOVERY - ADDTL 15 MIN: Performed by: STUDENT IN AN ORGANIZED HEALTH CARE EDUCATION/TRAINING PROGRAM

## 2025-07-15 PROCEDURE — 3600000004 HC SURGERY LEVEL 4 BASE: Performed by: STUDENT IN AN ORGANIZED HEALTH CARE EDUCATION/TRAINING PROGRAM

## 2025-07-15 PROCEDURE — C1769 GUIDE WIRE: HCPCS | Performed by: STUDENT IN AN ORGANIZED HEALTH CARE EDUCATION/TRAINING PROGRAM

## 2025-07-15 PROCEDURE — 2720000010 HC SURG SUPPLY STERILE: Performed by: STUDENT IN AN ORGANIZED HEALTH CARE EDUCATION/TRAINING PROGRAM

## 2025-07-15 PROCEDURE — 2500000003 HC RX 250 WO HCPCS: Performed by: STUDENT IN AN ORGANIZED HEALTH CARE EDUCATION/TRAINING PROGRAM

## 2025-07-15 PROCEDURE — 3700000001 HC ADD 15 MINUTES (ANESTHESIA): Performed by: STUDENT IN AN ORGANIZED HEALTH CARE EDUCATION/TRAINING PROGRAM

## 2025-07-15 PROCEDURE — 3600000014 HC SURGERY LEVEL 4 ADDTL 15MIN: Performed by: STUDENT IN AN ORGANIZED HEALTH CARE EDUCATION/TRAINING PROGRAM

## 2025-07-15 PROCEDURE — 0QSH04Z REPOSITION LEFT TIBIA WITH INTERNAL FIXATION DEVICE, OPEN APPROACH: ICD-10-PCS | Performed by: STUDENT IN AN ORGANIZED HEALTH CARE EDUCATION/TRAINING PROGRAM

## 2025-07-15 PROCEDURE — 0QHH06Z INSERTION OF INTRAMEDULLARY INTERNAL FIXATION DEVICE INTO LEFT TIBIA, OPEN APPROACH: ICD-10-PCS | Performed by: STUDENT IN AN ORGANIZED HEALTH CARE EDUCATION/TRAINING PROGRAM

## 2025-07-15 PROCEDURE — 94640 AIRWAY INHALATION TREATMENT: CPT

## 2025-07-15 PROCEDURE — C1713 ANCHOR/SCREW BN/BN,TIS/BN: HCPCS | Performed by: STUDENT IN AN ORGANIZED HEALTH CARE EDUCATION/TRAINING PROGRAM

## 2025-07-15 PROCEDURE — 0SSG04Z REPOSITION LEFT ANKLE JOINT WITH INTERNAL FIXATION DEVICE, OPEN APPROACH: ICD-10-PCS | Performed by: STUDENT IN AN ORGANIZED HEALTH CARE EDUCATION/TRAINING PROGRAM

## 2025-07-15 PROCEDURE — 85025 COMPLETE CBC W/AUTO DIFF WBC: CPT

## 2025-07-15 PROCEDURE — 2709999900 HC NON-CHARGEABLE SUPPLY: Performed by: STUDENT IN AN ORGANIZED HEALTH CARE EDUCATION/TRAINING PROGRAM

## 2025-07-15 PROCEDURE — 2700000000 HC OXYGEN THERAPY PER DAY

## 2025-07-15 PROCEDURE — 6360000002 HC RX W HCPCS: Performed by: ANESTHESIOLOGY

## 2025-07-15 PROCEDURE — 83735 ASSAY OF MAGNESIUM: CPT

## 2025-07-15 PROCEDURE — 36415 COLL VENOUS BLD VENIPUNCTURE: CPT

## 2025-07-15 PROCEDURE — 94761 N-INVAS EAR/PLS OXIMETRY MLT: CPT

## 2025-07-15 PROCEDURE — 6370000000 HC RX 637 (ALT 250 FOR IP): Performed by: NURSE PRACTITIONER

## 2025-07-15 PROCEDURE — 6360000002 HC RX W HCPCS: Performed by: NURSE ANESTHETIST, CERTIFIED REGISTERED

## 2025-07-15 PROCEDURE — 3700000000 HC ANESTHESIA ATTENDED CARE: Performed by: STUDENT IN AN ORGANIZED HEALTH CARE EDUCATION/TRAINING PROGRAM

## 2025-07-15 PROCEDURE — 7100000000 HC PACU RECOVERY - FIRST 15 MIN: Performed by: STUDENT IN AN ORGANIZED HEALTH CARE EDUCATION/TRAINING PROGRAM

## 2025-07-15 DEVICE — SCREW BONE L20MM DIA3MM ANK S STL CANC NONLOCKING LO PROF: Type: IMPLANTABLE DEVICE | Site: FIBULA | Status: FUNCTIONAL

## 2025-07-15 DEVICE — ADVANCED LOCKING SCREW: Type: IMPLANTABLE DEVICE | Site: TIBIA | Status: FUNCTIONAL

## 2025-07-15 DEVICE — SCREW BNE L16MM DIA3MM CANC ANK S STL NONLOCKING LO PROF: Type: IMPLANTABLE DEVICE | Site: FIBULA | Status: FUNCTIONAL

## 2025-07-15 DEVICE — TIBIAL NAIL
Type: IMPLANTABLE DEVICE | Site: TIBIA | Status: FUNCTIONAL
Brand: T2 ALPHA

## 2025-07-15 DEVICE — IMPLANTABLE DEVICE: Type: IMPLANTABLE DEVICE | Site: FIBULA | Status: FUNCTIONAL

## 2025-07-15 DEVICE — SCREW BNE L34MM DIA3.5MM MINI FT ANK TI SELF DRL ST CANN: Type: IMPLANTABLE DEVICE | Site: FIBULA | Status: FUNCTIONAL

## 2025-07-15 DEVICE — LOCKING SCREW
Type: IMPLANTABLE DEVICE | Site: FIBULA | Status: FUNCTIONAL
Brand: T2 ALPHA

## 2025-07-15 DEVICE — 3.0X130MM FIBULA NAIL, LEFT
Type: IMPLANTABLE DEVICE | Site: FIBULA | Status: FUNCTIONAL
Brand: ARTHREX®

## 2025-07-15 RX ORDER — FENTANYL CITRATE 50 UG/ML
INJECTION, SOLUTION INTRAMUSCULAR; INTRAVENOUS
Status: DISCONTINUED | OUTPATIENT
Start: 2025-07-15 | End: 2025-07-15 | Stop reason: SDUPTHER

## 2025-07-15 RX ORDER — ROCURONIUM BROMIDE 10 MG/ML
INJECTION, SOLUTION INTRAVENOUS
Status: DISCONTINUED | OUTPATIENT
Start: 2025-07-15 | End: 2025-07-15 | Stop reason: SDUPTHER

## 2025-07-15 RX ORDER — SODIUM CHLORIDE 0.9 % (FLUSH) 0.9 %
5-40 SYRINGE (ML) INJECTION EVERY 12 HOURS SCHEDULED
Status: DISCONTINUED | OUTPATIENT
Start: 2025-07-15 | End: 2025-07-15 | Stop reason: HOSPADM

## 2025-07-15 RX ORDER — FENTANYL CITRATE 50 UG/ML
25 INJECTION, SOLUTION INTRAMUSCULAR; INTRAVENOUS EVERY 5 MIN PRN
Status: DISCONTINUED | OUTPATIENT
Start: 2025-07-15 | End: 2025-07-15 | Stop reason: HOSPADM

## 2025-07-15 RX ORDER — DEXAMETHASONE SODIUM PHOSPHATE 4 MG/ML
4 INJECTION, SOLUTION INTRA-ARTICULAR; INTRALESIONAL; INTRAMUSCULAR; INTRAVENOUS; SOFT TISSUE ONCE
Status: COMPLETED | OUTPATIENT
Start: 2025-07-15 | End: 2025-07-15

## 2025-07-15 RX ORDER — FENTANYL CITRATE 50 UG/ML
100 INJECTION, SOLUTION INTRAMUSCULAR; INTRAVENOUS ONCE
Status: DISCONTINUED | OUTPATIENT
Start: 2025-07-15 | End: 2025-07-15 | Stop reason: HOSPADM

## 2025-07-15 RX ORDER — MAGNESIUM HYDROXIDE 1200 MG/15ML
LIQUID ORAL CONTINUOUS PRN
Status: COMPLETED | OUTPATIENT
Start: 2025-07-15 | End: 2025-07-15

## 2025-07-15 RX ORDER — PROPOFOL 10 MG/ML
INJECTION, EMULSION INTRAVENOUS
Status: DISCONTINUED | OUTPATIENT
Start: 2025-07-15 | End: 2025-07-15 | Stop reason: SDUPTHER

## 2025-07-15 RX ORDER — ALBUTEROL SULFATE 90 UG/1
INHALANT RESPIRATORY (INHALATION)
Status: DISCONTINUED | OUTPATIENT
Start: 2025-07-15 | End: 2025-07-15 | Stop reason: SDUPTHER

## 2025-07-15 RX ORDER — ONDANSETRON 2 MG/ML
INJECTION INTRAMUSCULAR; INTRAVENOUS
Status: DISCONTINUED | OUTPATIENT
Start: 2025-07-15 | End: 2025-07-15 | Stop reason: SDUPTHER

## 2025-07-15 RX ORDER — SODIUM CHLORIDE, SODIUM LACTATE, POTASSIUM CHLORIDE, CALCIUM CHLORIDE 600; 310; 30; 20 MG/100ML; MG/100ML; MG/100ML; MG/100ML
INJECTION, SOLUTION INTRAVENOUS
Status: DISCONTINUED | OUTPATIENT
Start: 2025-07-15 | End: 2025-07-15 | Stop reason: SDUPTHER

## 2025-07-15 RX ORDER — SODIUM CHLORIDE 9 MG/ML
INJECTION, SOLUTION INTRAVENOUS PRN
Status: DISCONTINUED | OUTPATIENT
Start: 2025-07-15 | End: 2025-07-15 | Stop reason: HOSPADM

## 2025-07-15 RX ORDER — PHENYLEPHRINE HCL IN 0.9% NACL 1 MG/10 ML
SYRINGE (ML) INTRAVENOUS
Status: DISCONTINUED | OUTPATIENT
Start: 2025-07-15 | End: 2025-07-15 | Stop reason: SDUPTHER

## 2025-07-15 RX ORDER — KETOROLAC TROMETHAMINE 30 MG/ML
INJECTION, SOLUTION INTRAMUSCULAR; INTRAVENOUS
Status: DISCONTINUED | OUTPATIENT
Start: 2025-07-15 | End: 2025-07-15 | Stop reason: SDUPTHER

## 2025-07-15 RX ORDER — MIDAZOLAM HYDROCHLORIDE 2 MG/2ML
2 INJECTION, SOLUTION INTRAMUSCULAR; INTRAVENOUS ONCE
Status: COMPLETED | OUTPATIENT
Start: 2025-07-15 | End: 2025-07-15

## 2025-07-15 RX ORDER — DIPHENHYDRAMINE HYDROCHLORIDE 50 MG/ML
12.5 INJECTION, SOLUTION INTRAMUSCULAR; INTRAVENOUS
Status: DISCONTINUED | OUTPATIENT
Start: 2025-07-15 | End: 2025-07-15 | Stop reason: HOSPADM

## 2025-07-15 RX ORDER — SODIUM CHLORIDE 0.9 % (FLUSH) 0.9 %
5-40 SYRINGE (ML) INJECTION PRN
Status: DISCONTINUED | OUTPATIENT
Start: 2025-07-15 | End: 2025-07-15 | Stop reason: HOSPADM

## 2025-07-15 RX ORDER — ONDANSETRON 2 MG/ML
4 INJECTION INTRAMUSCULAR; INTRAVENOUS
Status: DISCONTINUED | OUTPATIENT
Start: 2025-07-15 | End: 2025-07-15 | Stop reason: HOSPADM

## 2025-07-15 RX ORDER — DEXAMETHASONE SODIUM PHOSPHATE 10 MG/ML
INJECTION, SOLUTION INTRA-ARTICULAR; INTRALESIONAL; INTRAMUSCULAR; INTRAVENOUS; SOFT TISSUE
Status: DISCONTINUED | OUTPATIENT
Start: 2025-07-15 | End: 2025-07-15 | Stop reason: SDUPTHER

## 2025-07-15 RX ORDER — GLYCOPYRROLATE 1 MG/5 ML
SYRINGE (ML) INTRAVENOUS
Status: DISCONTINUED | OUTPATIENT
Start: 2025-07-15 | End: 2025-07-15 | Stop reason: SDUPTHER

## 2025-07-15 RX ADMIN — ENOXAPARIN SODIUM 30 MG: 100 INJECTION SUBCUTANEOUS at 21:00

## 2025-07-15 RX ADMIN — GABAPENTIN 300 MG: 300 CAPSULE ORAL at 16:18

## 2025-07-15 RX ADMIN — PHENOBARBITAL 16.2 MG: 16.2 TABLET ORAL at 09:58

## 2025-07-15 RX ADMIN — Medication 100 MCG: at 11:55

## 2025-07-15 RX ADMIN — DEXAMETHASONE SODIUM PHOSPHATE 4 MG: 4 INJECTION INTRA-ARTICULAR; INTRALESIONAL; INTRAMUSCULAR; INTRAVENOUS; SOFT TISSUE at 10:49

## 2025-07-15 RX ADMIN — OXYCODONE 5 MG: 5 TABLET ORAL at 04:46

## 2025-07-15 RX ADMIN — KETOROLAC TROMETHAMINE 15 MG: 30 INJECTION, SOLUTION INTRAMUSCULAR at 13:07

## 2025-07-15 RX ADMIN — PROPOFOL 30 MG: 10 INJECTION, EMULSION INTRAVENOUS at 13:33

## 2025-07-15 RX ADMIN — METHOCARBAMOL 750 MG: 750 TABLET ORAL at 06:05

## 2025-07-15 RX ADMIN — OXYCODONE 5 MG: 5 TABLET ORAL at 15:55

## 2025-07-15 RX ADMIN — SENNOSIDES 8.6 MG: 8.6 TABLET, FILM COATED ORAL at 21:00

## 2025-07-15 RX ADMIN — ONDANSETRON 4 MG: 2 INJECTION, SOLUTION INTRAMUSCULAR; INTRAVENOUS at 13:07

## 2025-07-15 RX ADMIN — FENTANYL CITRATE 50 MCG: 50 INJECTION, SOLUTION INTRAMUSCULAR; INTRAVENOUS at 11:38

## 2025-07-15 RX ADMIN — BUDESONIDE AND FORMOTEROL FUMARATE DIHYDRATE 2 PUFF: 160; 4.5 AEROSOL RESPIRATORY (INHALATION) at 08:59

## 2025-07-15 RX ADMIN — FENTANYL CITRATE 50 MCG: 50 INJECTION, SOLUTION INTRAMUSCULAR; INTRAVENOUS at 11:52

## 2025-07-15 RX ADMIN — ALBUTEROL SULFATE 8 PUFF: 90 AEROSOL, METERED RESPIRATORY (INHALATION) at 13:24

## 2025-07-15 RX ADMIN — DEXAMETHASONE SODIUM PHOSPHATE 10 MG: 10 INJECTION INTRAMUSCULAR; INTRAVENOUS at 11:48

## 2025-07-15 RX ADMIN — LEVOTHYROXINE SODIUM 75 MCG: 0.07 TABLET ORAL at 06:06

## 2025-07-15 RX ADMIN — FOLIC ACID 1 MG: 1 TABLET ORAL at 09:58

## 2025-07-15 RX ADMIN — Medication 200 MCG: at 13:06

## 2025-07-15 RX ADMIN — Medication 2000 MG: at 21:01

## 2025-07-15 RX ADMIN — Medication 0.4 MG: at 12:13

## 2025-07-15 RX ADMIN — Medication 100 MCG: at 12:00

## 2025-07-15 RX ADMIN — FENTANYL CITRATE 25 MCG: 50 INJECTION, SOLUTION INTRAMUSCULAR; INTRAVENOUS at 12:54

## 2025-07-15 RX ADMIN — ESCITALOPRAM OXALATE 20 MG: 10 TABLET ORAL at 09:59

## 2025-07-15 RX ADMIN — PROPOFOL 130 MG: 10 INJECTION, EMULSION INTRAVENOUS at 11:38

## 2025-07-15 RX ADMIN — PANTOPRAZOLE SODIUM 40 MG: 40 INJECTION, POWDER, FOR SOLUTION INTRAVENOUS at 21:00

## 2025-07-15 RX ADMIN — OXYCODONE 5 MG: 5 TABLET ORAL at 21:00

## 2025-07-15 RX ADMIN — ACETAMINOPHEN 1000 MG: 500 TABLET ORAL at 06:05

## 2025-07-15 RX ADMIN — PROPOFOL 40 MG: 10 INJECTION, EMULSION INTRAVENOUS at 13:17

## 2025-07-15 RX ADMIN — ACETAMINOPHEN 1000 MG: 500 TABLET ORAL at 15:52

## 2025-07-15 RX ADMIN — SODIUM CHLORIDE, POTASSIUM CHLORIDE, SODIUM LACTATE AND CALCIUM CHLORIDE: 600; 310; 30; 20 INJECTION, SOLUTION INTRAVENOUS at 10:30

## 2025-07-15 RX ADMIN — FENTANYL CITRATE 25 MCG: 50 INJECTION INTRAMUSCULAR; INTRAVENOUS at 14:11

## 2025-07-15 RX ADMIN — SODIUM CHLORIDE: 0.9 INJECTION, SOLUTION INTRAVENOUS at 00:02

## 2025-07-15 RX ADMIN — PHENOBARBITAL 16.2 MG: 16.2 TABLET ORAL at 21:00

## 2025-07-15 RX ADMIN — BUDESONIDE AND FORMOTEROL FUMARATE DIHYDRATE 2 PUFF: 160; 4.5 AEROSOL RESPIRATORY (INHALATION) at 21:29

## 2025-07-15 RX ADMIN — Medication 100 MCG: at 12:10

## 2025-07-15 RX ADMIN — METHOCARBAMOL 750 MG: 750 TABLET ORAL at 15:52

## 2025-07-15 RX ADMIN — MIDAZOLAM HYDROCHLORIDE 2 MG: 1 INJECTION, SOLUTION INTRAMUSCULAR; INTRAVENOUS at 10:49

## 2025-07-15 RX ADMIN — Medication 100 MG: at 09:58

## 2025-07-15 RX ADMIN — GABAPENTIN 300 MG: 300 CAPSULE ORAL at 06:04

## 2025-07-15 RX ADMIN — Medication 2000 MG: at 11:47

## 2025-07-15 RX ADMIN — TIOTROPIUM BROMIDE INHALATION SPRAY 5 MCG: 3.12 SPRAY, METERED RESPIRATORY (INHALATION) at 08:59

## 2025-07-15 RX ADMIN — ACETAMINOPHEN 1000 MG: 500 TABLET ORAL at 21:00

## 2025-07-15 RX ADMIN — PROPOFOL 30 MG: 10 INJECTION, EMULSION INTRAVENOUS at 13:30

## 2025-07-15 RX ADMIN — SUGAMMADEX 130 MG: 100 INJECTION, SOLUTION INTRAVENOUS at 13:10

## 2025-07-15 RX ADMIN — Medication 100 MCG: at 13:27

## 2025-07-15 RX ADMIN — FENTANYL CITRATE 25 MCG: 50 INJECTION, SOLUTION INTRAMUSCULAR; INTRAVENOUS at 12:59

## 2025-07-15 RX ADMIN — SODIUM CHLORIDE, POTASSIUM CHLORIDE, SODIUM LACTATE AND CALCIUM CHLORIDE: 600; 310; 30; 20 INJECTION, SOLUTION INTRAVENOUS at 12:12

## 2025-07-15 RX ADMIN — PROPOFOL 30 MG: 10 INJECTION, EMULSION INTRAVENOUS at 13:18

## 2025-07-15 RX ADMIN — ROCURONIUM BROMIDE 50 MG: 10 INJECTION, SOLUTION INTRAVENOUS at 11:38

## 2025-07-15 ASSESSMENT — PAIN DESCRIPTION - ORIENTATION
ORIENTATION: LEFT

## 2025-07-15 ASSESSMENT — LIFESTYLE VARIABLES: SMOKING_STATUS: 1

## 2025-07-15 ASSESSMENT — PAIN SCALES - GENERAL
PAINLEVEL_OUTOF10: 6
PAINLEVEL_OUTOF10: 7
PAINLEVEL_OUTOF10: 3
PAINLEVEL_OUTOF10: 9
PAINLEVEL_OUTOF10: 7
PAINLEVEL_OUTOF10: 5
PAINLEVEL_OUTOF10: 5
PAINLEVEL_OUTOF10: 8
PAINLEVEL_OUTOF10: 7
PAINLEVEL_OUTOF10: 7

## 2025-07-15 ASSESSMENT — COPD QUESTIONNAIRES: CAT_SEVERITY: MODERATE

## 2025-07-15 ASSESSMENT — PAIN DESCRIPTION - LOCATION
LOCATION: LEG
LOCATION: ANKLE
LOCATION: ANKLE;LEG
LOCATION: LEG
LOCATION: ANKLE;LEG
LOCATION: ARM;LEG
LOCATION: ANKLE

## 2025-07-15 ASSESSMENT — PAIN DESCRIPTION - DESCRIPTORS
DESCRIPTORS: THROBBING;STABBING
DESCRIPTORS: ACHING;SORE;THROBBING
DESCRIPTORS: THROBBING;STABBING

## 2025-07-15 ASSESSMENT — PAIN SCALES - WONG BAKER: WONGBAKER_NUMERICALRESPONSE: HURTS A LITTLE BIT

## 2025-07-15 NOTE — PROGRESS NOTES
Occupational Therapy    [facility]  Occupational Therapy Not Seen Note    DATE: 7/15/2025    NAME: Bella Barillas  MRN: [id number]   : 1955      Patient not seen this date for Occupational Therapy due to:    Surgery/Procedure: OR with ortho today TIBIA INTRAMEDULLARY NAIL INSERTION. Check post-op or .      Electronically signed by Vianca Moss OT on 7/15/2025 at 8:18 AM

## 2025-07-15 NOTE — PROGRESS NOTES
POST OP NOTE    SUBJECTIVE  Pt s/p left tibia intramedullary nail insertion, open reduction internal fixation pilon with fibula fixation, syndesmotic fixation, stress examination. She ordered food and feels hungry.    OBJECTIVE  VITALS:  /63   Pulse 78   Temp 98.1 °F (36.7 °C) (Oral)   Resp 20   Ht 1.6 m (5' 3\")   Wt 61.8 kg (136 lb 3.9 oz)   SpO2 99%   BMI 24.13 kg/m²         GENERAL:  awake and alert.  No acute distress  CARDIOVASCULAR:  regular rate and rhythm   LUNGS:  Symmetric chest rise, sating 98% on 2L oxygen via nasal canula  ABDOMEN:   Abdomen soft, non-tender, non-distended  INCISION: LLE with ace wrap    ASSESSMENT  1. POD# 0 s/p left tibia intramedullary nail insertion, open reduction internal fixation pilon with fibula fixation, syndesmotic fixation, stress examination    PLAN  1. Pain management-Tylenol, gabapentin, Robaxin  2. DVT proph-Lovenox  3. GI proph-Protonix  4. Will get postop H/H and give blood as needed.    Colleen Pappas,   Trauma/Surgery Service  7/15/2025 at 5:15 PM

## 2025-07-15 NOTE — PROGRESS NOTES
Orthopedic Progress Note    Patient:  Bella Barillas  YOB: 1955     69 y.o. female    Subjective:  Patient seen and examined this morning at bedside.  Patient states that she had pain in her left leg overnight.  She verbalized that she is frustrated she has not been able to get out of bed since her admission.  Patient stated that she is going to get a knee scooter postop to be able to go back to work.  No issues overnight per nursing.  Denies fever, HA, CP, SOB, N/V, dysuria, new numbness/tingling.  Patient remains in posterior long-leg splint to the left lower extremity.  Will plan for OR today with Dr. Shen.    Vitals reviewed, afebrile.    Objective:   Vitals:    07/15/25 0418   BP: 104/68   Pulse: 69   Resp: 15   Temp: 98.1 °F (36.7 °C)   SpO2: 98%     Gen: NAD, cooperative    Cardiovascular: Regular rate on monitor    Respiratory: No acute respiratory distress, breathing comfortably    Orthopedic Exam  LLE: Posterior long-leg splint on the patient's left lower extremity.  Ace bandage overlying splint is clean, dry, and intact.  Exposed digits can flex and extend.  Sensation grossly intact to exposed digits.  Toes are warm and well-perfused with BCR to exposed digits.       Recent Labs     07/12/25  2125 07/14/25  0943   WBC 7.6 6.3   HGB 12.5 10.2*   HCT 38.7 30.9*    266   INR 0.9  --    * 136   K 4.0 4.0   BUN 9 11   CREATININE 1.0* 0.9   GLUCOSE 85 117*      Meds:   Abx: Ancef OCTOR  DVT ppx: Lovenox per primary (trauma)  See rec for complete list    Impression 69 y.o. female who FFSH being seen for:    - Left distal tibial shaft fracture  - Left distal fibula fracture  - Left proximal fibula fracture    Plan  - Plan for OR today, 7/15/2025, with Dr. Shen.  - Ancef OCTOR  - Splint on LLE. Please keep clean and dry.   - NWB  to the LLE.  - Pain control and medical management per primary: trauma   - Multimodal pain control ordered   - DVT ppx: Lovenox per primary:

## 2025-07-15 NOTE — ANESTHESIA PRE PROCEDURE
Department of Anesthesiology  Preprocedure Note       Name:  Bella Barillas   Age:  69 y.o.  :  1955                                          MRN:  [id number]         Date:  7/15/2025      Surgeon: Surgeon(s):  Ariel Shen DO    Procedure: Procedure(s):  TIBIA INTRAMEDULLARY NAIL INSERTION  (LONNY TIBIAL NAIL, ARTHREX FIBULA NAIL, 3080 TABLE WITH EXTENSION, BONE FOAM, C-ARM, TRAUMA TOOL BOX)    Medications prior to admission:   Prior to Admission medications    Medication Sig Start Date End Date Taking? Authorizing Provider   vitamin D (ERGOCALCIFEROL) 1.25 MG (16513 UT) CAPS capsule Take 1 capsule by mouth once a week 25  Yes Clemente Brown DO   Budeson-Glycopyrrol-Formoterol (BREZTRI AEROSPHERE) 160-9-4.8 MCG/ACT AERO Inhale 2 puffs into the lungs 2 times daily   Yes ProviderAruna MD   escitalopram (LEXAPRO) 20 MG tablet Take 1 tablet by mouth daily   Yes ProviderAruna MD   albuterol (ACCUNEB) 0.63 MG/3ML nebulizer solution Take 3 mLs by nebulization every 6 hours as needed for Wheezing 3/20/21  Yes Goldberger, Erica B, MD   levothyroxine (SYNTHROID) 75 MCG tablet Take 1 tablet by mouth Daily   Yes Provider, MD Aruna       Current medications:    Current Facility-Administered Medications   Medication Dose Route Frequency Provider Last Rate Last Admin   • fentaNYL (SUBLIMAZE) injection 100 mcg  100 mcg IntraVENous Once Kiana House MD       • [Transfer Hold] folic acid (FOLVITE) tablet 1 mg  1 mg Oral Daily Vanesa Rosenbaum APRN - CNP   1 mg at 07/15/25 0958   • [Transfer Hold] thiamine tablet 100 mg  100 mg Oral Daily Vanesa Rosenbaum APRN - CNP   100 mg at 07/15/25 0958   • [Transfer Hold] senna (SENOKOT) tablet 8.6 mg  1 tablet Oral Nightly Vanesa Rosenbaum APRN - CNP   8.6 mg at 25   • [Transfer Hold] 0.9 % sodium chloride infusion   IntraVENous Continuous FallVanesa mccartney  mL/hr at 07/15/25 0603 Rate Verify at 07/15/25 0603   •

## 2025-07-15 NOTE — PLAN OF CARE
Problem: Discharge Planning  Goal: Discharge to home or other facility with appropriate resources  7/15/2025 1841 by Trixie Lloyd RN  Outcome: Progressing  7/15/2025 0520 by Carie Lowry RN  Outcome: Progressing     Problem: Pain  Goal: Verbalizes/displays adequate comfort level or baseline comfort level  7/15/2025 1841 by Trixie Lloyd RN  Outcome: Progressing  7/15/2025 0520 by Carie Lowry RN  Outcome: Progressing     Problem: Safety - Adult  Goal: Free from fall injury  7/15/2025 1841 by Trixie Lloyd RN  Outcome: Progressing  7/15/2025 0520 by Carie Lowry RN  Outcome: Progressing     Problem: ABCDS Injury Assessment  Goal: Absence of physical injury  7/15/2025 1841 by Trixie Lloyd RN  Outcome: Progressing  7/15/2025 0520 by Carie Lowry RN  Outcome: Progressing     Problem: Neurosensory - Adult  Goal: Achieves stable or improved neurological status  7/15/2025 1841 by Trixie Lloyd RN  Outcome: Progressing  7/15/2025 0520 by Carie Lowry RN  Outcome: Progressing     Problem: Skin/Tissue Integrity - Adult  Goal: Skin integrity remains intact  7/15/2025 1841 by Trixie Lloyd RN  Outcome: Progressing  7/15/2025 0520 by Carie Lowry RN  Outcome: Progressing     Problem: Musculoskeletal - Adult  Goal: Return mobility to safest level of function  7/15/2025 1841 by Trixie Lloyd RN  Outcome: Progressing  7/15/2025 0520 by Carie Lowry RN  Outcome: Progressing     Problem: Genitourinary - Adult  Goal: Absence of urinary retention  7/15/2025 1841 by Trixie Lloyd RN  Outcome: Progressing  7/15/2025 0520 by Carie Lowry RN  Outcome: Progressing     Problem: Respiratory - Adult  Goal: Achieves optimal ventilation and oxygenation  7/15/2025 1841 by Trixie Lloyd RN  Outcome: Progressing  7/15/2025 0520 by Carie Lowry RN  Outcome: Progressing

## 2025-07-15 NOTE — PROGRESS NOTES
Physical Therapy        Physical Therapy Cancel Note      DATE: 7/15/2025    NAME: Bella Barillas  MRN: [id number]   : 1955      Patient not seen this date for Physical Therapy due to:    Surgery/Procedure: OR with ortho today TIBIA INTRAMEDULLARY NAIL INSERTION. Check post-op        Electronically signed by Laverne Martinez PT on 7/15/2025 at 8:32 AM

## 2025-07-15 NOTE — DISCHARGE INSTRUCTIONS
Orthopedic Instructions:  -Weight bearing status: Non weight bearing for the left leg  -Keep dressing clean and dry.  -Do not remove dressings or splint  -If splint were to fall off or become saturated, do not attempt to put back on or dry out. Return to ED immediately for reapplication.  -Always look for signs of compartment syndrome: pain out of proportion to the injury, pain not controlled with pain medication, numbness in digits, changing of color of digits (paleness). If these signs occur return to ED immediately for reassessment.   -Always work on motion (to non-injured toes) while in splint to decrease swelling.  -Dress with plastic bag and rubber band to seal and repeat with second bag and rubber band when showering.  \"Press & Seal\" wrap also works for sealing the rubber bag when showering.  -Starting 3 days after surgery, Ok to shower but no soaks in a bath, hot tub, pool, etc  -Ice (20 minutes on and off 1 hour) and elevate above the level of the heart to reduce swelling and throbbing pain.  -Drink plenty of fluids.  -Call the office or come to Emergency Room if signs of infection appear (hot, swollen, red, draining pus, fever)  -Take medications as prescribed.  -Wean off narcotics (percocet/norco) as soon as possible. Do not take tylenol if still taking narcotics.  -Follow up with Dr. Shen in their office on 8/1/2025 8:30 AM after surgery. Call [phone] to schedule/confirm.

## 2025-07-15 NOTE — CONSULTS
Orthopedic Consult      CHIEF COMPLAINT: Left leg pain    HISTORY OF PRESENT ILLNESS:      The patient is a 69 y.o. female who presented to [facility] on 7/12/2025.  She fell from standing height.  She sustained a left tibial plafond, left tibial shaft fracture, and left segmental fibula fracture.  She had no other orthopedic complaints.  She does smoke daily.  Denies any numbness or tingling.  She normally ambulates without any assistive devices.  Patient's daughter-in-law is present during my evaluation.  Patient was placed in a splint.  I was asked to assist with patient's care.    Past Medical History:    Past Medical History:   Diagnosis Date    COPD (chronic obstructive pulmonary disease) (HCC)     Thyroid disease        Past Surgical History:    History reviewed. No pertinent surgical history.    Medications Prior to Admission:   Prior to Admission medications    Medication Sig Start Date End Date Taking? Authorizing Provider   vitamin D (ERGOCALCIFEROL) 1.25 MG (02086 UT) CAPS capsule Take 1 capsule by mouth once a week 7/13/25  Yes Clemente Brown DO   Budeson-Glycopyrrol-Formoterol (BREZTRI AEROSPHERE) 160-9-4.8 MCG/ACT AERO Inhale 2 puffs into the lungs 2 times daily   Yes Aruna Crenshaw MD   escitalopram (LEXAPRO) 20 MG tablet Take 1 tablet by mouth daily   Yes Aruna Crenshaw MD   albuterol (ACCUNEB) 0.63 MG/3ML nebulizer solution Take 3 mLs by nebulization every 6 hours as needed for Wheezing 3/20/21  Yes Goldberger, Erica B, MD   levothyroxine (SYNTHROID) 75 MCG tablet Take 1 tablet by mouth Daily   Yes ProviderAruna MD       Allergies:    Patient has no known allergies.      Social History:   Social History     Socioeconomic History    Marital status: Single     Spouse name: None    Number of children: None    Years of education: None    Highest education level: None   Tobacco Use    Smoking status: Every Day     Types: Cigarettes    Smokeless tobacco:

## 2025-07-15 NOTE — BRIEF OP NOTE
Brief Postoperative Note      Patient: Bella Barillas  YOB: 1955  MRN: [id number]    Date of Procedure: 7/15/2025    Pre-Op Diagnosis Codes:  Closed fracture of left tibial shaft  Closed fracture of left pilon  Closed fracture of left distal fibula  Closed fracture of left proximal fibula  Left ankle syndesmosis injury    Post-Op Diagnosis:  Closed fracture of left tibial shaft  Closed fracture of left pilon  Closed fracture of left distal fibula  Closed fracture of left proximal fibula  Left ankle syndesmosis injury       Procedure(s):  Left tibia intramedullary nail fixation    Open reduction internal fixation left tibial plafond with fibular fixation    Left ankle syndesmotic fixation    Left ankle stress exam under anesthesia with independent interpretation of radiographs      Surgeon(s):  Ariel Shen DO    Assistant:  Resident: Marvin Wu DO    Anesthesia: General    Estimated Blood Loss (mL): 100 mL    IVF: 1600 mL Crystalloids    Complications: None    Specimens:   * No specimens in log *    Implants:  Implant Name Type Inv. Item Serial No.  Lot No. LRB No. Used Action   NAIL IM TIB 9X330 MM STRL T2 ALPHA - [id number]  NAIL IM TIB 9X330 MM STRL T2 ALPHA  [employer] [facility] [id number] Left 1 Implanted   SCREW LK ADV IMN SYS 5X40MM - [id number]  SCREW LK ADV IMN SYS 5X40MM  [employer] [facility]  Left 1 Implanted   SCREW LK ADV IMN SYS 5X50MM - [id number]  SCREW LK ADV IMN SYS 5X50MM  [employer] [facility] [id number] Left 1 Implanted   SCREW LK ADV IMN SYS 5V560WF - [id number]  SCREW LK ADV IMN SYS [id number]  [employer] [facility] [id number] Left 1 Implanted   LOCKING SCREW - [id number]  LOCKING SCREW  [employer] [facility] [id number] Left 1 Implanted   SCREW BNE LCK 5X35 MM ADV STRL - [id number]  SCREW BNE LCK 5X35 MM ADV STRL  [employer] [facility] [id number] [facility] 1

## 2025-07-15 NOTE — PLAN OF CARE
Orthopedic post-op plan:    Bella Barillas is a 69 y.o. female who is s/p   Left tibia intramedullary nail fixation     Open reduction with internal fixation of left ankle pilon     Open reduction with internal fixation of left distal fibula     Left ankle syndesmotic fixation     Left ankle stress exam under anesthesia with independent interpretation of radiographs     Closed treat of left proximal fibula fracture  POD0:    NWB LLE  Complete post op antibiotics  Short leg splint on LLE, please maintain  Reinforce dressings as needed per nursing  Post op X-rays in PACU  Diet OK  Ok to restart chemical AC on POD1  Ice and elevate for pain and swelling   PT/OT eval and treat  Pain: recommend multimodal per primary  Dispo: OK for discharge once medically optimized per primary and F/u Dr. Shen in 2 weeks post-op    Electronically signed by Marvin Wu DO on 7/15/2025 at 1:59 PM.

## 2025-07-15 NOTE — PROGRESS NOTES
[facility]patient Progress Note  ROBERTO Harvey   7/15/2025    Bella Barillas  1955  [id number]      Time spent with Patient:  0 minutes  Time spent with Family:  0 minutes    This writer was unable to complete screen or therapy services with patient at bedside at this time due to the following:  Out of room for medical procedure  Patient in OR today, clinician following post procedure

## 2025-07-15 NOTE — PLAN OF CARE
Problem: Discharge Planning  Goal: Discharge to home or other facility with appropriate resources  7/15/2025 0520 by Carie Lowry RN  Outcome: Progressing  7/14/2025 1700 by Trixie Lloyd RN  Outcome: Progressing     Problem: Pain  Goal: Verbalizes/displays adequate comfort level or baseline comfort level  7/15/2025 0520 by Carie Lowry RN  Outcome: Progressing  7/14/2025 1700 by Trixie Lloyd RN  Outcome: Progressing     Problem: Safety - Adult  Goal: Free from fall injury  7/15/2025 0520 by Carie Lowry RN  Outcome: Progressing  7/14/2025 1700 by Trixie Lloyd RN  Outcome: Progressing     Problem: ABCDS Injury Assessment  Goal: Absence of physical injury  7/15/2025 0520 by Carie Lowry RN  Outcome: Progressing  7/14/2025 1700 by Trixie Lloyd RN  Outcome: Progressing     Problem: Neurosensory - Adult  Goal: Achieves stable or improved neurological status  7/15/2025 0520 by Carie Lowry RN  Outcome: Progressing  7/14/2025 1700 by Trixie Lloyd RN  Outcome: Progressing     Problem: Skin/Tissue Integrity - Adult  Goal: Skin integrity remains intact  7/15/2025 0520 by Carie Lowry RN  Outcome: Progressing  7/14/2025 1700 by Trixie Lloyd RN  Outcome: Progressing     Problem: Musculoskeletal - Adult  Goal: Return mobility to safest level of function  7/15/2025 0520 by Carie Lowry RN  Outcome: Progressing  7/14/2025 1700 by Trixie Lloyd RN  Outcome: Progressing     Problem: Genitourinary - Adult  Goal: Absence of urinary retention  7/15/2025 0520 by Carie Lowry RN  Outcome: Progressing  7/14/2025 1700 by Trixie Lloyd RN  Outcome: Progressing

## 2025-07-16 VITALS
DIASTOLIC BLOOD PRESSURE: 61 MMHG | WEIGHT: 136.69 LBS | HEART RATE: 77 BPM | SYSTOLIC BLOOD PRESSURE: 101 MMHG | RESPIRATION RATE: 17 BRPM | HEIGHT: 63 IN | TEMPERATURE: 97.9 F | BODY MASS INDEX: 24.22 KG/M2 | OXYGEN SATURATION: 90 %

## 2025-07-16 LAB
BASOPHILS # BLD: <0.03 K/UL (ref 0–0.2)
BASOPHILS NFR BLD: 0 % (ref 0–2)
EOSINOPHIL # BLD: <0.03 K/UL (ref 0–0.44)
EOSINOPHILS RELATIVE PERCENT: 0 % (ref 1–4)
ERYTHROCYTE [DISTWIDTH] IN BLOOD BY AUTOMATED COUNT: 13.7 % (ref 11.8–14.4)
HCT VFR BLD AUTO: 24.2 % (ref 36.3–47.1)
HCT VFR BLD AUTO: 25.1 % (ref 36.3–47.1)
HGB BLD-MCNC: 8.1 G/DL (ref 11.9–15.1)
HGB BLD-MCNC: 8.1 G/DL (ref 11.9–15.1)
IMM GRANULOCYTES # BLD AUTO: 0.05 K/UL (ref 0–0.3)
IMM GRANULOCYTES NFR BLD: 1 %
LYMPHOCYTES NFR BLD: 0.83 K/UL (ref 1.1–3.7)
LYMPHOCYTES RELATIVE PERCENT: 8 % (ref 24–43)
MCH RBC QN AUTO: 34.9 PG (ref 25.2–33.5)
MCHC RBC AUTO-ENTMCNC: 32.3 G/DL (ref 28.4–34.8)
MCV RBC AUTO: 108.2 FL (ref 82.6–102.9)
MONOCYTES NFR BLD: 1.2 K/UL (ref 0.1–1.2)
MONOCYTES NFR BLD: 12 % (ref 3–12)
NEUTROPHILS NFR BLD: 79 % (ref 36–65)
NEUTS SEG NFR BLD: 7.76 K/UL (ref 1.5–8.1)
NRBC BLD-RTO: 0 PER 100 WBC
PLATELET # BLD AUTO: 249 K/UL (ref 138–453)
PMV BLD AUTO: 10.1 FL (ref 8.1–13.5)
RBC # BLD AUTO: 2.32 M/UL (ref 3.95–5.11)
RBC # BLD: ABNORMAL 10*6/UL
WBC OTHER # BLD: 9.8 K/UL (ref 3.5–11.3)

## 2025-07-16 PROCEDURE — 97530 THERAPEUTIC ACTIVITIES: CPT

## 2025-07-16 PROCEDURE — 85014 HEMATOCRIT: CPT

## 2025-07-16 PROCEDURE — 85018 HEMOGLOBIN: CPT

## 2025-07-16 PROCEDURE — 6360000002 HC RX W HCPCS

## 2025-07-16 PROCEDURE — 6370000000 HC RX 637 (ALT 250 FOR IP)

## 2025-07-16 PROCEDURE — 2700000000 HC OXYGEN THERAPY PER DAY

## 2025-07-16 PROCEDURE — 36415 COLL VENOUS BLD VENIPUNCTURE: CPT

## 2025-07-16 PROCEDURE — 51798 US URINE CAPACITY MEASURE: CPT

## 2025-07-16 PROCEDURE — 97535 SELF CARE MNGMENT TRAINING: CPT

## 2025-07-16 PROCEDURE — 97166 OT EVAL MOD COMPLEX 45 MIN: CPT

## 2025-07-16 PROCEDURE — 97162 PT EVAL MOD COMPLEX 30 MIN: CPT

## 2025-07-16 PROCEDURE — 97116 GAIT TRAINING THERAPY: CPT

## 2025-07-16 PROCEDURE — 85025 COMPLETE CBC W/AUTO DIFF WBC: CPT

## 2025-07-16 PROCEDURE — 99232 SBSQ HOSP IP/OBS MODERATE 35: CPT | Performed by: NURSE PRACTITIONER

## 2025-07-16 PROCEDURE — 94640 AIRWAY INHALATION TREATMENT: CPT

## 2025-07-16 PROCEDURE — 94761 N-INVAS EAR/PLS OXIMETRY MLT: CPT

## 2025-07-16 RX ORDER — FOLIC ACID 1 MG/1
1 TABLET ORAL DAILY
Qty: 30 TABLET | Refills: 0 | Status: SHIPPED | OUTPATIENT
Start: 2025-07-17

## 2025-07-16 RX ORDER — GABAPENTIN 300 MG/1
300 CAPSULE ORAL EVERY 8 HOURS
Qty: 21 CAPSULE | Refills: 0 | Status: SHIPPED | OUTPATIENT
Start: 2025-07-16 | End: 2025-07-23

## 2025-07-16 RX ORDER — PANTOPRAZOLE SODIUM 40 MG/1
40 TABLET, DELAYED RELEASE ORAL
Status: DISCONTINUED | OUTPATIENT
Start: 2025-07-17 | End: 2025-07-16 | Stop reason: HOSPADM

## 2025-07-16 RX ORDER — OXYCODONE HYDROCHLORIDE 5 MG/1
5 TABLET ORAL EVERY 8 HOURS PRN
Qty: 10 TABLET | Refills: 0 | Status: SHIPPED | OUTPATIENT
Start: 2025-07-16 | End: 2025-07-17 | Stop reason: SDUPTHER

## 2025-07-16 RX ORDER — LANOLIN ALCOHOL/MO/W.PET/CERES
100 CREAM (GRAM) TOPICAL DAILY
Qty: 30 TABLET | Refills: 0 | Status: SHIPPED | OUTPATIENT
Start: 2025-07-17

## 2025-07-16 RX ORDER — METHOCARBAMOL 750 MG/1
750 TABLET, FILM COATED ORAL EVERY 6 HOURS
Qty: 40 TABLET | Refills: 0 | Status: SHIPPED | OUTPATIENT
Start: 2025-07-16 | End: 2025-07-26

## 2025-07-16 RX ORDER — SENNOSIDES 8.6 MG/1
1 TABLET ORAL NIGHTLY
Qty: 30 TABLET | Refills: 0 | Status: SHIPPED | OUTPATIENT
Start: 2025-07-16 | End: 2025-08-15

## 2025-07-16 RX ADMIN — POLYETHYLENE GLYCOL 3350 17 G: 17 POWDER, FOR SOLUTION ORAL at 08:17

## 2025-07-16 RX ADMIN — BUDESONIDE AND FORMOTEROL FUMARATE DIHYDRATE 2 PUFF: 160; 4.5 AEROSOL RESPIRATORY (INHALATION) at 07:56

## 2025-07-16 RX ADMIN — METHOCARBAMOL 750 MG: 750 TABLET ORAL at 11:33

## 2025-07-16 RX ADMIN — METHOCARBAMOL 750 MG: 750 TABLET ORAL at 05:15

## 2025-07-16 RX ADMIN — Medication 2000 MG: at 04:52

## 2025-07-16 RX ADMIN — ESCITALOPRAM OXALATE 20 MG: 10 TABLET ORAL at 08:17

## 2025-07-16 RX ADMIN — ACETAMINOPHEN 1000 MG: 500 TABLET ORAL at 14:10

## 2025-07-16 RX ADMIN — Medication 100 MG: at 08:18

## 2025-07-16 RX ADMIN — LEVOTHYROXINE SODIUM 75 MCG: 0.07 TABLET ORAL at 05:15

## 2025-07-16 RX ADMIN — ACETAMINOPHEN 1000 MG: 500 TABLET ORAL at 05:15

## 2025-07-16 RX ADMIN — ENOXAPARIN SODIUM 30 MG: 100 INJECTION SUBCUTANEOUS at 08:18

## 2025-07-16 RX ADMIN — FOLIC ACID 1 MG: 1 TABLET ORAL at 08:18

## 2025-07-16 RX ADMIN — GABAPENTIN 300 MG: 300 CAPSULE ORAL at 14:10

## 2025-07-16 RX ADMIN — GABAPENTIN 300 MG: 300 CAPSULE ORAL at 08:28

## 2025-07-16 ASSESSMENT — PAIN SCALES - GENERAL: PAINLEVEL_OUTOF10: 5

## 2025-07-16 ASSESSMENT — PAIN DESCRIPTION - LOCATION: LOCATION: LEG

## 2025-07-16 NOTE — PLAN OF CARE
Problem: Respiratory - Adult  Goal: Achieves optimal ventilation and oxygenation  7/16/2025 0759 by Dalila Bruno, RCADI  Outcome: Progressing

## 2025-07-16 NOTE — PROGRESS NOTES
PROGRESS NOTE          PATIENT NAME: Bella Barillas  MEDICAL RECORD NO. [id number]  DATE: 7/16/2025    HD: # 4      DIAGNOSIS AND PLAN    Fall    L tib/fib fracture   Ortho consulted   S/p left tibia IMN, ORIF left tibial plafond with fibular fixation, left ankle syndesmotic fixation   NWB LLE    Per ortho patient cannot use knee scooter at this time-do not bear weight through knee. They will re evaluate at follow up    Etoh use   Monitor for withdrawal   On phenobarbital taper   Continue thiamine/folic acid    Pain management   Continue tylenol, neurontin, robaxin, prn jackie    Postoperative anemia   Recheck hgb at 1400    GI   General diet   Start supplements   Continue glycolax   continue senokot    DVT proph   Continue lovenox    Dispo   Pending hgb at 1400   Pt/ot   Patient plan is to go home.      Chief Complaint: \"pain\"    SUBJECTIVE    Patient seen at bedside. Patient states she does have pain to her leg.  States she feels her little toe does not have space to move. States that she also works as an  and would like to be able to go to work on Tuesday. Patient states she feels she would do better if she could use a knee scooter.    OBJECTIVE  VITALS:   Vitals:    07/16/25 0834   BP: (!) 90/59   Pulse: 71   Resp: 20   Temp: 98.1 °F (36.7 °C)   SpO2: 100%     Physical Exam  Constitutional:       General: She is awake.   HENT:      Head: Normocephalic.   Cardiovascular:      Rate and Rhythm: Normal rate.      Heart sounds: Murmur heard.   Pulmonary:      Effort: Pulmonary effort is normal.   Abdominal:      Palpations: Abdomen is soft.   Musculoskeletal:      Comments: Splint intact to LLE   Skin:     General: Skin is warm.   Neurological:      Mental Status: She is alert.      GCS: GCS eye subscore is 4. GCS verbal subscore is 5. GCS motor subscore is 6.             LAB:  CBC:   Recent Labs     07/14/25  0943 07/15/25  0458 07/15/25  1811 07/16/25  0717   WBC 6.3 6.9  --  9.8   HGB 10.2* 10.2*

## 2025-07-16 NOTE — CARE COORDINATION
Trauma Coordinator Rounding Note  Daily check in:  I met with Bella Barillas  at bedside to review their plan of care. I allowed opportunity for Bella Barillas to ask questions regarding their injuries, expected length of stay and disposition plan. Discussed with patient her discharge plan. Pt is adamant that she wants to go home and not a facility. She reports she is able to get around with the walker and did steps today with PT. She reports she only has 2 steps into house. She confirmed she would have a ride home. Walker is being ordered by CM. All questions answered to verbal satisfaction.   [x] Wounds / Injuries  [x] PT/OT/speech  [x] Incentive spirometer  [x] Diet  [x] Activity  [] Paperwork  [x] Pain Management Check-in  Bedside Nurse:  I spoke with the patient's assigned nurse to review the plan of care for today and provide an opportunity to ask questions or relay any concerns to the Trauma service. No needs currently.     :  I provided a clinical update to the unit  and confirmed the disposition plan. Current barriers to discharge include: Rolling walker.   Electronically signed by Radha Salcido RN on 7/16/2025 at 3:30 PM

## 2025-07-16 NOTE — PLAN OF CARE
Problem: Physical Therapy - Adult  Goal: By Discharge: Performs mobility at highest level of function for planned discharge setting.  See evaluation for individualized goals.  7/16/2025 1103 by Fiona Perez SPT  Outcome: Progressing     Problem: Discharge Planning  Goal: Discharge to home or other facility with appropriate resources  Outcome: Adequate for Discharge     Problem: Pain  Goal: Verbalizes/displays adequate comfort level or baseline comfort level  Outcome: Adequate for Discharge     Problem: Safety - Adult  Goal: Free from fall injury  Outcome: Adequate for Discharge     Problem: ABCDS Injury Assessment  Goal: Absence of physical injury  Outcome: Adequate for Discharge     Problem: Neurosensory - Adult  Goal: Achieves stable or improved neurological status  Outcome: Adequate for Discharge     Problem: Skin/Tissue Integrity - Adult  Goal: Skin integrity remains intact  Outcome: Adequate for Discharge     Problem: Musculoskeletal - Adult  Goal: Return mobility to safest level of function  Outcome: Adequate for Discharge     Problem: Genitourinary - Adult  Goal: Absence of urinary retention  Outcome: Adequate for Discharge     Problem: Respiratory - Adult  Goal: Achieves optimal ventilation and oxygenation  7/16/2025 1721 by Momo Multani RN  Outcome: Adequate for Discharge  7/16/2025 0759 by Dalila Bruno RCP  Outcome: Progressing

## 2025-07-16 NOTE — PLAN OF CARE
Problem: Discharge Planning  Goal: Discharge to home or other facility with appropriate resources  7/16/2025 0258 by Vianca Forrester RN  Outcome: Progressing  7/15/2025 1841 by Trixie Lloyd RN  Outcome: Progressing     Problem: Pain  Goal: Verbalizes/displays adequate comfort level or baseline comfort level  7/16/2025 0258 by Vianca Forrester RN  Outcome: Progressing  7/15/2025 1841 by Trixie Lloyd RN  Outcome: Progressing     Problem: Safety - Adult  Goal: Free from fall injury  7/16/2025 0258 by Vianca Forrester RN  Outcome: Progressing  7/15/2025 1841 by Trixie Lloyd RN  Outcome: Progressing     Problem: ABCDS Injury Assessment  Goal: Absence of physical injury  7/16/2025 0258 by Vianca Forrester RN  Outcome: Progressing  7/15/2025 1841 by Trixie Lloyd RN  Outcome: Progressing     Problem: Neurosensory - Adult  Goal: Achieves stable or improved neurological status  7/16/2025 0258 by Vianca Forrester RN  Outcome: Progressing  7/15/2025 1841 by Trixie Lloyd RN  Outcome: Progressing     Problem: Skin/Tissue Integrity - Adult  Goal: Skin integrity remains intact  7/16/2025 0258 by Vianca Forrester RN  Outcome: Progressing  7/15/2025 1841 by Trixie Lloyd RN  Outcome: Progressing     Problem: Musculoskeletal - Adult  Goal: Return mobility to safest level of function  7/16/2025 0258 by Vianca Forrester RN  Outcome: Progressing  7/15/2025 1841 by Trixie Lloyd RN  Outcome: Progressing     Problem: Genitourinary - Adult  Goal: Absence of urinary retention  7/16/2025 0258 by Vianca Forrester RN  Outcome: Progressing  7/15/2025 1841 by Trixie Lloyd RN  Outcome: Progressing     Problem: Respiratory - Adult  Goal: Achieves optimal ventilation and oxygenation  7/16/2025 0258 by Vianca Forrester RN  Outcome: Progressing  7/15/2025 1841 by Trixie Lloyd RN  Outcome: Progressing

## 2025-07-16 NOTE — PLAN OF CARE
Problem: Physical Therapy - Adult  Goal: By Discharge: Performs mobility at highest level of function for planned discharge setting.  See evaluation for individualized goals.  7/16/2025 1103 by Fiona Perez, SPT  Outcome: Progressing

## 2025-07-16 NOTE — CONSULTS
[facility] Inpatient Brief Diagnostic Assessment Note  Ralph Gamez ROBERTO   7/16/2025    Bella Barillas  1955  [id number]      Time Spent with Patient: 15 minutes or less (Brief Diagnostic Assessment) 67851    Pt was provided informed consent for the Trauma [facility]. Discussed with patient model of service to include the limits of confidentiality (i.e. abuse reporting, suicide intervention, etc.) and short-term intervention focused approach.  Pt indicated understanding.    [facility] Inpatient or Virtual Question: This was not a virtual session    Is consult a Victim of Crime?: No    Presenting Patient Report:    Patient presents as a 69 y.o. female subsequent to hospital admission following fall at a friend's house resulting in injury. Patient was seen in room. Patient friendly and cooperative with assessment, noting no specific concerns or needs, other than feeling ready to go home and that she does miss her pet cat. ITSS conducted and TRAUMA [facility] information provided and explained.    Patient was able to complete the following screens: ITSS          MSE:     Appearance:   Hospital Gown  Speech    spontaneous, normal rate, and normal volume  Affect Observed   Appropriate to Context  Thought Content    intact  Thought Process    linear, goal directed, and coherent  Associations    logical connections  Insight    Good  Judgment    Intact  Orientation    oriented to person, place, time, and general circumstances      Patient reports and/or exhibits the following symptoms:    Mood: Appropriate to Context    Cognitive symptoms: Appropriate to Context    Behaviors: Appropriate to Context    Somatic: None Reported        Assessment:    Patient denies any previous or current symptoms for depression or anxiety.  Patient scored low on Injured Trauma Survivor Screen (ITSS).  Patient does not meet criteria for depression or anxiety diagnosis at this time.     Screening Scale Results (If

## 2025-07-16 NOTE — CARE COORDINATION
Case Management   Daily Progress Note       Patient Name: Bella Barillas                   YOB: 1955  Diagnosis: Closed disp oblique fracture of shaft of left fibula with malunion [S82.432P]  Fall, initial encounter [W19.XXXA]  Maisonneuve fracture of fibula, left, closed, initial encounter [S82.862A]                       GMLOS: 4.2 days  Length of Stay: 4  days    Anticipated Discharge Date: To be determined    Readmission Risk (Low < 19, Mod (19-27), High > 27): Readmission Risk Score: 12.6        Current Transitional Plan    [x] Home Independently    [] Home with HC    [] Skilled Nursing Facility    [] Acute Rehabilitation    [] Long Term Acute Care (LTAC)    [] Other:     Plan for the Stay (Medical Management) :          Workflow Continuation (Additional Notes) :  Talked with patient. Plan is to go home alone. States has friends to help. No to rehab. [facility] needs equipment at home.    1120 faxed order for rolling walker with face to face to [employer]-talked with Rogelio Thibodeaux, RN  July 16, 2025

## 2025-07-16 NOTE — PROGRESS NOTES
[facility] Progress Note  ROBERTO Harvey   7/16/2025    Bella Barillas  1955  [id number]      Time spent with Patient:  0 minutes  Time spent with Family:  0 minutes    This writer was unable to complete screen or therapy services with patient at bedside at this time due to the following:  Other service provider in room / procedure being performed    Patient working with PT/OT at time of visit

## 2025-07-16 NOTE — ANESTHESIA POSTPROCEDURE EVALUATION
Department of Anesthesiology  Postprocedure Note    Patient: Bella Barillas  MRN: [id number]  YOB: 1955  Date of evaluation: 7/16/2025    Procedure Summary       Date: 07/15/25 Room / Location: [address]    Anesthesia Start: 1127 Anesthesia Stop: 1400    Procedure: TIBIA INTRAMEDULLARY NAIL INSERTION, OPEN REDUCTION INTERNAL FIXATION PILON WITH FIBULA FIXATION, SYNDESMOTIC FIXATION, STRESS EXAMINATION (Left) Diagnosis:       Closed fracture of left tibia and fibula, initial encounter      (Closed fracture of left tibia and fibula, initial encounter [S82.202A, S82.402A])    Surgeons: Ariel Shen DO Responsible Provider: Kiana House MD    Anesthesia Type: general ASA Status: 2            Anesthesia Type: No value filed.    Nando Phase I: Nando Score: 8    Nando Phase II:      Anesthesia Post Evaluation    Patient location during evaluation: bedside  Patient participation: complete - patient participated  Level of consciousness: awake and awake and alert  Pain score: 2  Airway patency: patent  Nausea & Vomiting: no nausea and no vomiting  Cardiovascular status: blood pressure returned to baseline and hemodynamically stable  Respiratory status: acceptable  Hydration status: euvolemic  Pain management: adequate        No notable events documented.

## 2025-07-16 NOTE — CASE COMMUNICATION
Bella Barillas was evaluated today and a DME order was entered for a wheeled walker because she requires this to successfully complete daily living tasks of toileting, personal cares, and ambulating.  A wheeled walker is necessary due to the patient's unsteady gait, upper body weakness, and inability to  an ambulation device; and she can ambulate only by pushing a walker instead of a lesser assistive device such as a cane, crutch, or standard walker.  The need for this equipment was discussed with the patient and she understands and is in agreement.

## 2025-07-16 NOTE — PROGRESS NOTES
Occupational Therapy  Occupational Therapy Initial Evaluation  Facility/Department: [facility] 5C STEPDOWN   Patient Name: Bella Barillas        MRN: [id number]    : 1955    Date of Service: 2025    Chief Complaint   Patient presents with    Fall     Past Medical History:  has a past medical history of COPD (chronic obstructive pulmonary disease) (HCC) and Thyroid disease.  Past Surgical History:  has no past surgical history on file.    Discharge Recommendations  Discharge Recommendations: Patient would benefit from continued therapy after discharge  OT Equipment Recommendations  Equipment Needed: Yes  Mobility Devices: ADL Assistive Devices;Walker  Walker: Rolling  ADL Assistive Devices: Transfer Tub Bench;Grab Bars - tub;Grab Bars - toilet    Assessment  Performance deficits / Impairments: Decreased functional mobility ;Decreased balance;Decreased ADL status;Decreased endurance;Decreased high-level IADLs  Assessment: Pt agreed to occupational therapy evaluation with education provided on purpose and role of occupational therapy services in the acute care setting. OT facilitated assessing functional mobility and ADL performance to pt's tolerance this visit. Pt exhibited requiring SBA for bed mobility, CGA for functional sit<>stand transfers and CGA for functional mobility. Transfers/mobility completed utilizing RW. OT facilitated LB dressing, toileting, and grooming and pt required SBA, CGA, and CGA to complete, see below for more details. Based on the patient's performance throughout this evaluation and the above listed deficits, it is expected they are to require skilled OT services during their acute hospitilization to increase safety and promote independence throughout ADLs, IADLs and functional mobility tasks. Pt is currently unsafe to return to their prior living arrangements without 24/7 assist/supervision to safely engage in all aspects of ADLs, IADLs and functional mobility tasks.  Prognosis:

## 2025-07-16 NOTE — PROGRESS NOTES
Orthopedic Progress Note    Patient:  Bella Barillas  YOB: 1955     69 y.o. female    Subjective:  Patient is POD1 following left tibia intramedullary nailing with fibular fixation. Patient was seen and examined this morning at bedside.  Patient resting comfortably in bed and reports no complaints or concerns. No issues overnight per nursing. Pain is well controlled on current regimen. Denies fever, HA, CP, SOB, N/V, new numbness/tingling.  Proximal aspect of Ace bandage taken down and Optifoam dressings placed over incisions around the knee.  Splint and other dressings clean dry and intact.  Patient adamant about going home today    Vitals reviewed, afebrile    Objective:   Vitals:    07/15/25 2130   BP:    Pulse:    Resp: 14   Temp:    SpO2:      Gen: NAD, cooperative    Cardiovascular: Regular rate on the monitor  Respiratory: No acute respiratory distress, breathing comfortably    Orthopedic Exam  LLE: Posterior long-leg splint on patient's left lower extremity.  Ace bandage removed and proximal wounds dressed with Optifoam.  Dressing splint clean dry and intact.  Exposed digits able to flex and extend.  Sensation intact to light touch in exposed digits.  Toes are warm and well perfused.       Recent Labs     07/14/25  0943 07/15/25  0458 07/15/25  1811   WBC 6.3 6.9  --    HGB 10.2* 10.2* 8.9*   HCT 30.9* 31.1* 27.2*    258  --      --   --    K 4.0  --   --    BUN 11  --   --    CREATININE 0.9  --   --    GLUCOSE 117*  --   --       Meds:   Abx: .  Postop Ancef  DVT ppx: Lovenox  See rec for complete list    Impression   The patient is a 69 y.o. female with:  Closed fracture of left tibial shaft  Closed fracture of left pilon  Closed fracture of left distal fibula  Closed fracture of left proximal fibula  Left ankle syndesmosis injury    DOS: 7/15.  - Left tibia IMN  - ORIF left tibial plafond with fibular fixation  - Left ankle syndesmotic fixation  - Left ankle stress exam

## 2025-07-16 NOTE — PROGRESS NOTES
Physical Therapy    Facility/Department: [address] STEPDOWN   Physical Therapy Initial Evaluation    Patient Name: Bella Barillas        MRN: [id number]    : 1955    Date of Service: 2025    Chief Complaint   Patient presents with    Fall     Past Medical History:  has a past medical history of COPD (chronic obstructive pulmonary disease) (HCC) and Thyroid disease.  Past Surgical History:  has a past surgical history that includes Tibia fracture surgery (Left, 7/15/2025).    Discharge Recommendations  Discharge Recommendations: Patient would benefit from continued therapy after discharge  PT Equipment Recommendations  Equipment Needed: Yes  Mobility Devices: Walker  Walker: Rolling  Other: Pt would benfit from obtaining a RW to increase functional mobility and decrease fall risk.    Assessment  Body Structures, Functions, Activity Limitations Requiring Skilled Therapeutic Intervention: Decreased functional mobility , Decreased endurance, Decreased balance, Decreased safe awareness  Assessment: Pt performed transfers with supervision using RW and amb 60 feet with CGA using RW. Pt navigated 1 step with modA to prevent posterior LOB. Pt would benefit from extra person to help with stair negotiation. Pt would benefit from further PT intervention.  Therapy Prognosis: Good  Decision Making: Medium Complexity  Requires PT Follow-Up: Yes  Activity Tolerance  Activity Tolerance: Patient tolerated evaluation without incident  Safety Devices  Type of Devices: Patient at risk for falls, Gait belt, Left in chair, Call light within reach    AM-PAC  AM-PAC Basic Mobility - Inpatient   How much help is needed turning from your back to your side while in a flat bed without using bedrails?: None  How much help is needed moving from lying on your back to sitting on the side of a flat bed without using bedrails?: None  How much help is needed moving to and from a bed to a chair?: A Little  How much help is needed standing up from a

## 2025-07-17 ENCOUNTER — TELEPHONE (OUTPATIENT)
Dept: ORTHOPEDIC SURGERY | Age: 70
End: 2025-07-17

## 2025-07-17 DIAGNOSIS — S82.862A MAISONNEUVE FRACTURE OF FIBULA, LEFT, CLOSED, INITIAL ENCOUNTER: ICD-10-CM

## 2025-07-17 RX ORDER — OXYCODONE HYDROCHLORIDE 5 MG/1
5 TABLET ORAL EVERY 6 HOURS PRN
Qty: 28 TABLET | Refills: 0 | Status: SHIPPED | OUTPATIENT
Start: 2025-07-17 | End: 2025-07-24

## 2025-07-17 NOTE — PROGRESS NOTES
[facility] ([facility]) Inpatient Discharge Summary  ROBERTO Harvey  7/17/2025        Bella Barillas  1955  [id number]    Date & Time of Discharge: 7/16/2025  5:20 PM     Is consult a Victim of Crime?: No    Research Follow Up Concerns: None    Services provided:  Screenings: ITSS and Informational Packet Provided    Screen Results (If any):      ITSS:  Date Screen Completed: 7/16/2025  Patient's score= 0 for PTSD risk. ( >= 2 is positive for PTSD risk. )  Patient's score= 0 for depression risk.  ( >= 2 is positive for Depression risk )      Discharge Recommendations:  No outpatient mental health services recommended, contact Mental Health Provider if symptoms develop      The therapist may be reached through the [facility] offices at [phone].

## 2025-07-17 NOTE — TELEPHONE ENCOUNTER
Patient is calling in needing a refill on oxycodone. Would like it sent to [facility] on [address] .

## 2025-07-17 NOTE — TELEPHONE ENCOUNTER
Date of Procedure: 7/15/2025     Pre-Op Diagnosis Codes:  Closed fracture of left tibial shaft  Closed fracture of left pilon  Closed fracture of left distal fibula  Closed fracture of left proximal fibula  Left ankle syndesmosis injury     Post-Op Diagnosis:  Closed fracture of left tibial shaft  Closed fracture of left pilon  Closed fracture of left distal fibula  Closed fracture of left proximal fibula  Left ankle syndesmosis injury       Procedure(s):  Left tibia intramedullary nail fixation     Open reduction internal fixation left tibial plafond with fibular fixation     Left ankle syndesmotic fixation     Left ankle stress exam under anesthesia with independent interpretation of radiographs    Patient had oxycodone sent int to pharmacy at [facility] before she was discharged yesterday, states that she did not and can not get medication from the hospital and is asking if script could be sent in to different pharmacy. Medication pended. Please advise

## 2025-07-18 NOTE — DISCHARGE SUMMARY
DISCHARGE SUMMARY:    PATIENT NAME:  Bella Barillas  YOB: 1955  MEDICAL RECORD NO. [id number]  DATE: 07/18/25  PRIMARY CARE PHYSICIAN: Mendoza Lorenzo MD  ADMIT DATE:  7/12/2025    DISCHARGE DATE:  7/16/2025  DISPOSITION:  Home  ADMITTING DIAGNOSIS:   Fall    DIAGNOSIS:   Patient Active Problem List   Diagnosis    Closed disp oblique fracture of shaft of left fibula with malunion    Maisonneuve fracture of fibula, left, closed, initial encounter    Fall    Traumatic closed displaced fracture of tibial plafond of left lower extremity       CONSULTANTS:  Ortho    PROCEDURES:   7/15: OR-left tibia IMN, ORIF left tibial plafond with fibular fixation, left ankle syndesmotic fixation    HOSPITAL COURSE:   Bella Barillas is a 69 y.o. female who was admitted on 7/12/2025  Hospital Course:  CC: fall down 4 steps, -AC    Injuries: L tib/fib fx    Hospital Course:  7/12/2025 7/14: dc dilaudid  7/15: OR w/ ortho ORIF and IMN tib fib, EBL 100cc, postop, H/H 8.9 (10.2)  7/16: dc to home.    Labs and imaging were followed.    On day of discharge Bella Barillas  was tolerating a regular diet  She was deemed medically stable for discharge to Home        PHYSICAL EXAMINATION:        Discharge Vitals:  height is 1.6 m (5' 3\") and weight is 62 kg (136 lb 11 oz). Her oral temperature is 97.9 °F (36.6 °C). Her blood pressure is 101/61 and her pulse is 77. Her respiration is 17 and oxygen saturation is 90%.     LABS:     Recent Labs     07/15/25  1811 07/16/25  0717 07/16/25  1355   WBC  --  9.8  --    HGB 8.9* 8.1* 8.1*   HCT 27.2* 25.1* 24.2*   PLT  --  249  --        DIAGNOSTIC TESTS:    CT HEAD WO CONTRAST  Result Date: 7/12/2025  EXAMINATION: CT OF THE CERVICAL SPINE WITHOUT CONTRAST; CT OF THE HEAD WITHOUT CONTRAST 7/12/2025 10:40 pm TECHNIQUE: CT of the cervical spine was performed without the administration of intravenous contrast. Multiplanar reformatted images are provided for review. Automated exposure control, iterative

## 2025-07-25 DIAGNOSIS — S82.862A MAISONNEUVE FRACTURE OF FIBULA, LEFT, CLOSED, INITIAL ENCOUNTER: ICD-10-CM

## 2025-07-25 RX ORDER — OXYCODONE HYDROCHLORIDE 5 MG/1
5 TABLET ORAL EVERY 6 HOURS PRN
Qty: 28 TABLET | Refills: 0 | Status: SHIPPED | OUTPATIENT
Start: 2025-07-25 | End: 2025-08-01

## 2025-07-25 RX ORDER — METHOCARBAMOL 750 MG/1
750 TABLET, FILM COATED ORAL EVERY 6 HOURS
Qty: 40 TABLET | Refills: 0 | Status: SHIPPED | OUTPATIENT
Start: 2025-07-25 | End: 2025-08-04

## 2025-07-25 RX ORDER — GABAPENTIN 300 MG/1
300 CAPSULE ORAL EVERY 8 HOURS
Qty: 21 CAPSULE | Refills: 0 | Status: SHIPPED | OUTPATIENT
Start: 2025-07-25 | End: 2025-08-01

## 2025-08-01 ENCOUNTER — OFFICE VISIT (OUTPATIENT)
Dept: ORTHOPEDIC SURGERY | Age: 70
End: 2025-08-01

## 2025-08-01 VITALS — BODY MASS INDEX: 24.1 KG/M2 | HEIGHT: 63 IN | WEIGHT: 136 LBS

## 2025-08-01 DIAGNOSIS — S82.432P: ICD-10-CM

## 2025-08-01 DIAGNOSIS — R52 PAIN: Primary | ICD-10-CM

## 2025-08-01 DIAGNOSIS — S82.862A MAISONNEUVE FRACTURE OF FIBULA, LEFT, CLOSED, INITIAL ENCOUNTER: ICD-10-CM

## 2025-08-01 DIAGNOSIS — S82.872A CLOSED TRAUMATIC DISPLACED FRACTURE OF LEFT TIBIAL PLAFOND, INITIAL ENCOUNTER: ICD-10-CM
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
## 2025-08-01 PROCEDURE — 99024 POSTOP FOLLOW-UP VISIT: CPT | Performed by: PHYSICIAN ASSISTANT

## 2025-08-01 NOTE — PROGRESS NOTES
[facility]  [address]  [place] [address]  Dept Phone: [phone]  Dept Fax: [phone]      [facility]th[facility] Follow Up      Subjective:   Date of Surgery: 7/15/2025  - Left tibia intramedullary nail fixation  - Open reduction internal fixation left tibial plafond with fibular fixation  - Left ankle syndesmotic fixation  - Left ankle stress examination under anesthesia with independent interpretation of imaging    Bella Barillas is a 69 y.o. year old female who presents to the clinic today for routine follow up 17 days s/p IMN of left tibia, ORIF left tibial plafond and and fibula with syndesmotic fixation. Denies interval trauma. No concerns for infection. Doing well overall.  Patient was instructed to be nonweightbearing she does admit that she has been utilizing walker and essentially has been toe-touch weightbearing because it has been difficult to \"hop\" on just her right leg due to fatigue and discomfort.  She states otherwise her pain is overall been well-controlled she denies any interval injury, trauma or fall.    Review of Systems  Gen: no fever, chills, malaise  CV: no chest pain or palpitations  Resp: no cough or shortness of breath  GI: no nausea, vomiting, diarrhea, or constipation  Neuro: no seizures, vertigo, or headache  Msk: Post operative left leg pain  10 remaining systems reviewed and negative    Objective :   There were no vitals filed for this visit.Body mass index is 24.09 kg/m².  General: No acute distress, resting comfortably in the clinic  Neuro: alert. oriented  Eyes: Extra-ocular muscles intact  Pulm: Respirations unlabored and regular.  Skin: warm, well perfused  Psych:   Patient has good fund of knowledge and displays understanding of exam, diagnosis, and plan.  MSK: Left lower extremity: Incisions healing well with retained sutures and no drainage or concerns for infection. Mild/Moderate residual swelling. compartments soft. Limb is warm an

## 2025-08-07 DIAGNOSIS — S82.862A MAISONNEUVE FRACTURE OF FIBULA, LEFT, CLOSED, INITIAL ENCOUNTER: ICD-10-CM

## 2025-08-07 RX ORDER — OXYCODONE HYDROCHLORIDE 5 MG/1
5 TABLET ORAL EVERY 8 HOURS PRN
Qty: 21 TABLET | Refills: 0 | Status: SHIPPED | OUTPATIENT
Start: 2025-08-07 | End: 2025-08-14

## 2025-08-13 ENCOUNTER — OFFICE VISIT (OUTPATIENT)
Dept: ORTHOPEDIC SURGERY | Age: 70
End: 2025-08-13

## 2025-08-13 VITALS — RESPIRATION RATE: 15 BRPM | WEIGHT: 136 LBS | OXYGEN SATURATION: 98 % | BODY MASS INDEX: 24.1 KG/M2 | HEIGHT: 63 IN

## 2025-08-13 DIAGNOSIS — S82.432P: ICD-10-CM

## 2025-08-13 DIAGNOSIS — S82.872A CLOSED TRAUMATIC DISPLACED FRACTURE OF LEFT TIBIAL PLAFOND, INITIAL ENCOUNTER: ICD-10-CM

## 2025-08-13 DIAGNOSIS — S82.862A MAISONNEUVE FRACTURE OF FIBULA, LEFT, CLOSED, INITIAL ENCOUNTER: Primary | ICD-10-CM

## 2025-08-13 PROCEDURE — 99024 POSTOP FOLLOW-UP VISIT: CPT | Performed by: STUDENT IN AN ORGANIZED HEALTH CARE EDUCATION/TRAINING PROGRAM

## 2025-08-13 RX ORDER — CYCLOBENZAPRINE HCL 5 MG
5 TABLET ORAL 2 TIMES DAILY PRN
Qty: 10 TABLET | Refills: 0 | Status: SHIPPED | OUTPATIENT
Start: 2025-08-13 | End: 2025-08-23

## 2025-08-13 RX ORDER — DICLOFENAC SODIUM 75 MG/1
75 TABLET, DELAYED RELEASE ORAL 2 TIMES DAILY
Qty: 60 TABLET | Refills: 2 | Status: SHIPPED | OUTPATIENT
Start: 2025-08-13

## 2025-08-13 RX ORDER — HYDROCODONE BITARTRATE AND ACETAMINOPHEN 5; 325 MG/1; MG/1
1 TABLET ORAL EVERY 6 HOURS PRN
Qty: 28 TABLET | Refills: 0 | Status: SHIPPED | OUTPATIENT
Start: 2025-08-13 | End: 2025-08-20

## 2025-08-14 ENCOUNTER — TELEPHONE (OUTPATIENT)
Dept: ORTHOPEDIC SURGERY | Age: 70
End: 2025-08-14
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
## 2025-08-18 ENCOUNTER — HOSPITAL ENCOUNTER (OUTPATIENT)
Dept: PHYSICAL THERAPY | Facility: CLINIC | Age: 70
Setting detail: THERAPIES SERIES
Discharge: HOME | End: 2025-08-18
Payer: COMMERCIAL

## 2025-08-18 PROCEDURE — 97110 THERAPEUTIC EXERCISES: CPT

## 2025-08-18 PROCEDURE — 97161 PT EVAL LOW COMPLEX 20 MIN: CPT

## (undated) DEVICE — BIT DRL DIA2.7MM STR CANN FOR MINI COMPR FULL THRD SCR

## (undated) DEVICE — APPLICATOR MEDICATED 26 CC SOLUTION HI LT ORNG CHLORAPREP

## (undated) DEVICE — STRAP ARMBRD W1.5XL32IN FOAM STR YET SFT W/ HK AND LOOP

## (undated) DEVICE — K-WIRE
Type: IMPLANTABLE DEVICE | Site: TIBIA | Status: NON-FUNCTIONAL
Removed: 2025-07-15

## (undated) DEVICE — GLOVE ORTHO 8   MSG9480

## (undated) DEVICE — BIT DRILL SYNDESMOSIS 2.5 MM

## (undated) DEVICE — SUTURE PDS II SZ 0 L27IN ABSRB VLT L36MM CT-1 1/2 CIR Z340H

## (undated) DEVICE — APPLICATOR MEDICATED 10.5 CC SOLUTION HI LT ORNG CHLORAPREP

## (undated) DEVICE — GLOVE ORANGE PI 8   MSG9080

## (undated) DEVICE — FREEHAND DRILL: Brand: T2 ALPHA

## (undated) DEVICE — GOWN,AURORA,NONREINFORCED,LARGE: Brand: MEDLINE

## (undated) DEVICE — NAIL INSERTION SLEEVE, ELASTIC SPI DIAM.8-11: Brand: T2

## (undated) DEVICE — STOCKINETTE,IMPERVIOUS,12X48,STERILE: Brand: MEDLINE

## (undated) DEVICE — 3M™ IOBAN™ 2 ANTIMICROBIAL INCISE DRAPE 6650EZ: Brand: IOBAN™ 2

## (undated) DEVICE — BANDAGE,GAUZE,BULKEE II,4.5"X4.1YD,STRL: Brand: MEDLINE

## (undated) DEVICE — Device

## (undated) DEVICE — C-ARMOR C-ARM EQUIPMENT COVERS CLEAR STERILE UNIVERSAL FIT 12 PER CASE: Brand: C-ARMOR

## (undated) DEVICE — REAMER SHAFT, MOD.TRINKLE: Brand: BIXCUT

## (undated) DEVICE — SURGICAL SUCTION CONNECTING TUBE WITH MALE CONNECTOR AND SUCTION CLAMP, 2 FT. LONG (.6 M), 5 MM I.D.: Brand: CONMED

## (undated) DEVICE — STRAP,POSITIONING,KNEE/BODY,FOAM,4X60": Brand: MEDLINE

## (undated) DEVICE — FIXATION K-WIRE SPI, WCH COATED
Type: IMPLANTABLE DEVICE | Site: TIBIA | Status: NON-FUNCTIONAL
Brand: T2
Removed: 2025-07-15

## (undated) DEVICE — SYSTEM IMPL REAMER FIBULOCK PROX 3.2 MM DSTL 6.2 MM DRL 2 MM

## (undated) DEVICE — GOWN,SIRUS,NONRNF,SETINSLV,XL,20/CS: Brand: MEDLINE

## (undated) DEVICE — SUTURE MONOCRYL SZ 2-0 L27IN ABSRB UD SH L26MM TAPERPOINT NDL Y417H

## (undated) DEVICE — GUIDEWIRE ORTH DIA0.045IN W/ TRCR TIP LSR LN

## (undated) DEVICE — LOCKING DRILL

## (undated) DEVICE — SUTURE ETHILON SZ 2-0 L18IN NONABSORBABLE BLK L26MM PS 3/8 585H

## (undated) DEVICE — BNDG,ELST,MATRIX,STRL,6"X15YD,LF,HOOK&LP: Brand: MEDLINE